# Patient Record
Sex: MALE | Race: WHITE | NOT HISPANIC OR LATINO | Employment: OTHER | URBAN - METROPOLITAN AREA
[De-identification: names, ages, dates, MRNs, and addresses within clinical notes are randomized per-mention and may not be internally consistent; named-entity substitution may affect disease eponyms.]

---

## 2017-09-13 ENCOUNTER — TELEPHONE (OUTPATIENT)
Dept: HEMATOLOGY/ONCOLOGY | Facility: CLINIC | Age: 62
End: 2017-09-13

## 2022-02-10 ENCOUNTER — IMMUNIZATION (OUTPATIENT)
Dept: PRIMARY CARE CLINIC | Facility: CLINIC | Age: 67
End: 2022-02-10
Payer: COMMERCIAL

## 2022-02-10 DIAGNOSIS — Z23 NEED FOR VACCINATION: Primary | ICD-10-CM

## 2022-02-10 PROCEDURE — 91306 COVID-19, MRNA, LNP-S, PF, 100 MCG/0.25 ML DOSE VACCINE (MODERNA BOOSTER): CPT | Mod: PBBFAC | Performed by: INTERNAL MEDICINE

## 2022-02-10 PROCEDURE — 0064A COVID-19, MRNA, LNP-S, PF, 100 MCG/0.25 ML DOSE VACCINE (MODERNA BOOSTER): CPT | Mod: CV19,PBBFAC | Performed by: INTERNAL MEDICINE

## 2022-06-28 ENCOUNTER — TELEPHONE (OUTPATIENT)
Dept: INFECTIOUS DISEASES | Facility: HOSPITAL | Age: 67
End: 2022-06-28
Payer: MEDICARE

## 2022-06-28 ENCOUNTER — OFFICE VISIT (OUTPATIENT)
Dept: URGENT CARE | Facility: CLINIC | Age: 67
End: 2022-06-28
Payer: MEDICARE

## 2022-06-28 VITALS
HEART RATE: 71 BPM | TEMPERATURE: 98 F | OXYGEN SATURATION: 96 % | RESPIRATION RATE: 18 BRPM | DIASTOLIC BLOOD PRESSURE: 78 MMHG | SYSTOLIC BLOOD PRESSURE: 135 MMHG | HEIGHT: 69 IN | BODY MASS INDEX: 29.62 KG/M2 | WEIGHT: 200 LBS

## 2022-06-28 DIAGNOSIS — U07.1 COVID-19 VIRUS DETECTED: Primary | ICD-10-CM

## 2022-06-28 DIAGNOSIS — U07.1 LAB TEST POSITIVE FOR DETECTION OF COVID-19 VIRUS: ICD-10-CM

## 2022-06-28 DIAGNOSIS — U07.1 COVID-19: Primary | ICD-10-CM

## 2022-06-28 DIAGNOSIS — U07.1 COVID-19 VIRUS DETECTED: ICD-10-CM

## 2022-06-28 DIAGNOSIS — R05.9 COUGH: ICD-10-CM

## 2022-06-28 LAB
CTP QC/QA: YES
SARS-COV-2 RDRP RESP QL NAA+PROBE: POSITIVE

## 2022-06-28 PROCEDURE — 99203 PR OFFICE/OUTPT VISIT, NEW, LEVL III, 30-44 MIN: ICD-10-PCS | Mod: CR,S$GLB,, | Performed by: NURSE PRACTITIONER

## 2022-06-28 PROCEDURE — U0002: ICD-10-PCS | Mod: QW,CR,S$GLB, | Performed by: NURSE PRACTITIONER

## 2022-06-28 PROCEDURE — U0002 COVID-19 LAB TEST NON-CDC: HCPCS | Mod: QW,CR,S$GLB, | Performed by: NURSE PRACTITIONER

## 2022-06-28 PROCEDURE — 99203 OFFICE O/P NEW LOW 30 MIN: CPT | Mod: CR,S$GLB,, | Performed by: NURSE PRACTITIONER

## 2022-06-28 RX ORDER — ASPIRIN 81 MG/1
81 TABLET ORAL
COMMUNITY

## 2022-06-28 RX ORDER — EZETIMIBE 10 MG/1
5 TABLET ORAL NIGHTLY
COMMUNITY
Start: 2022-05-25 | End: 2023-05-25

## 2022-06-28 RX ORDER — LISINOPRIL 5 MG/1
5 TABLET ORAL
COMMUNITY
Start: 2022-05-25 | End: 2022-12-15 | Stop reason: CLARIF

## 2022-06-28 RX ORDER — TADALAFIL 5 MG/1
5 TABLET ORAL DAILY
COMMUNITY

## 2022-06-28 RX ORDER — ROSUVASTATIN CALCIUM 20 MG/1
20 TABLET, COATED ORAL NIGHTLY
COMMUNITY
Start: 2022-05-25 | End: 2023-05-25

## 2022-06-28 RX ORDER — FINASTERIDE 5 MG/1
5 TABLET, FILM COATED ORAL EVERY MORNING
COMMUNITY

## 2022-06-28 RX ORDER — GABAPENTIN 300 MG/1
300 CAPSULE ORAL 3 TIMES DAILY PRN
COMMUNITY
Start: 2021-12-02

## 2022-06-28 RX ORDER — TAMSULOSIN HYDROCHLORIDE 0.4 MG/1
0.4 CAPSULE ORAL EVERY MORNING
COMMUNITY

## 2022-06-28 RX ORDER — TIZANIDINE HYDROCHLORIDE 4 MG/1
4 CAPSULE, GELATIN COATED ORAL DAILY PRN
COMMUNITY

## 2022-06-28 RX ORDER — QUETIAPINE FUMARATE 25 MG/1
18.25 TABLET, FILM COATED ORAL NIGHTLY
COMMUNITY

## 2022-06-28 RX ORDER — LEVOTHYROXINE SODIUM 50 UG/1
50 TABLET ORAL
COMMUNITY

## 2022-06-28 RX ORDER — MELOXICAM 15 MG/1
15 TABLET ORAL
COMMUNITY

## 2022-06-28 NOTE — PATIENT INSTRUCTIONS
PLEASE READ YOUR DISCHARGE INSTRUCTIONS ENTIRELY AS IT CONTAINS IMPORTANT INFORMATION.    Patient had covid testing done today.  Discussed corona virus precautions and reviewed CDC FAC; printed a copy for patient.  I discussed to continue to monitor their symptoms. Discussed that if their symptoms persist or worsen to seek re-evaluation. Clinic vs. ER precautions were given.  Patient verbalized understanding and agreed with the entire plan of care.    If Negative and no direct exposure: symptom free without fever reducing meds in 24 hours - can go back to work in 24 hours with surgical mask for 10-14 days.    If Positive and significantly symptomatic: improved symptoms, no fever without fever reducing meds for 24 hours- have to be out for a minimum of 10 days passed from + test  with improvements in symptoms. MAY COME OUT OF QUARANTINE ON DAY 11.      If you tested positive and have symptoms, you must isolate for 5 days starting on the day of your first symptoms  OR day of the positive test if you are asymptomatic. After 5 days (ON DAY #6), if your symptoms have improved and you have not had fever on day 5, you can return to the community on day #6- NO TESTING REQUIRED to return to community! If no fever without fever reducing meds for 24 hours, before coming out of quarantine. After your 5 days of isolation are completed, the CDC recommends strict mask use for the first 5 days (day #6-10) that you come out of isolation.  MAY COME OUT OF QUARANTINE ON DAY#6 DATE** BUT CONTINUE STRICT MASKS FOR ANOTHER 5 DAYS. QUARANTINE START DATE**    This is the most important part, both the CDC and the [facility] emphasize that you do not test out of isolation. In fact, we do not retest if you were positive in the last 90 days.           - Reviewed radiographs and all diagnostic testing with patient/family.    - Rest.  Drink plenty of fluids.    - Tylenol OR anti-inflammatory (NSAIDs, ibuprofen, aleve, motrin) as directed as needed  for fever/pain.  For Tylenol, do not exceed 3000 mg/ day. If no contraindication or allergies.    - continue albuterol inhaler as needed for shortness of breath/wheezing  - do not operate machinery when taking cough syrup or pain meds as they may cause drowsiness.  - take Tessalon as needed for cough suppression. Take cough syrup as needed for cough during at night.     - If you were prescribed antibiotics, please take them to completion. Please supplement with OTC probiotics and yogurt.  Contact clinic if develop profuse diarrhea and weakness.  - If you are female and on birth control pills - please use additional methods of contraception to prevent pregnancy while on antibiotics and for one cycle after.     -Below are suggestions for symptomatic relief:              -Salt water gargles to soothe throat pain.              -Chloroseptic spray also helps to numb throat pain.              -Nasal saline spray reduces inflammation and dryness.              -Warm face compresses to help with facial sinus pain/pressure.              -Vicks vapor rub at night.              -Astelin NASAL SPRAY twice day for nasal/sinus congestion   **may also supplement with 2nd OTC nasal spray to help with inflammation and congestion. Wean to off when you nose becomes to dry or bleed. Also use nasal saline twice a day to help with dryness.               -Flonase OTC or Nasacort OTC  once a day for nasal/sinus congestion. DON'T USE IF YOU HAVE GLAUCOMA. CHECK WITH YOUR PHARMACIST/PHYSICIAN.              -Simple foods like chicken noodle soup.              -Mucinex DM (ANY COUGH EXPECTORANT) for cough or chest congestion with mucus during the day time. Delsym or robitussin (ANY COUGH SUPPRESSANT) helps with coughing at night. Mucinex-D if you have chest congestion or sputum (caution if history of high blood pressure or palpitations).              -Zyrtec/Claritin/xyzal during the day time  & Benadryl at night (only if severe runny nose) may  help with allergies and runny nose. Add decongestant if you have nasal/sinus congestion/sinus pressure/ear fullness sensation. (see below)              -may take OTC meclizine as needed for dizziness or nausea.     Caution with use of Decongestant meds:  -If you DO NOT have Hypertension or any history of palpitations, it is ok to take over the counter Sudafed or Mucinex D or Allegra-D or Claritin-D or Zyrtec-D.  -If you do take one of the above, it is ok to combine that with plain over the counter Mucinex or Allegra or Claritin or Zyrtec. If, for example, you are taking Zyrtec -D, you can combine that with Mucinex, but not Mucinex-D.  If you are taking Mucinex-D, you can combine that with plain Allegra or Claritin or Zyrtec.     -Do not combine pseudophed or phenylephrine with any other brand allergy-D for DECONGESTANT.   -Or vice versa, you can you take plain allergy medications (allegra/claritin/zyrtec with NO Decongestant) and ADD OTC pseudophed or phenelyphrine 2-3 times a day. Avoid taking decongestant late at night or with caffeine as it can keep you up or cause jittery feeling.    -If you DO have Hypertension , anxiety, or palpitations, it is safe to take Coricidin HBP for relief of sinus symptoms.      For your GI symptoms:  -Use gatorade/pedialyte or rehydration packets to help stay hydrated. Vitamin water and plain water do not contain rehydrating electrolytes.  -Increase clear liquids (water, gatorade, pedialyte, broths, jello, etc) Hold off on solids for 12-18 hours. Then advance to BRAT diet (banana, rice, applesauce, tea, toast/crackers), then advance further as tolerated. Avoid spicy or fatty foods.   -May take Emitrol OTC as needed for nausea.   -Use Peptobismol or Immodium to help alleviate your diarrhea symptoms.   -Take mylanta or simethicone for bloating or gas pain.   -Take pepcid or omeprazole if you have heartburn or reflux sensation.  -Avoid imodium unless you have more than 6 loose stools in  24 hours. Take 1 dose and monitor to see if you can repeat AS IT WILL CAUSE CONSTIPATION.  -Wash hands frequently while sick. Avoid ibuprofen or other NSAIDS until you are well.   -Please go to the ER if you experience worsening abdominal pain, blood in your vomit or stool, high fever, dizziness, fainting, swelling of your abdomen, inability to pass gas or stool, or inability to urinate.         -You must understand that you've received an Urgent Care treatment only and that you may be released before all your medical problems are known or treated. You, the patient, will arrange for follow up care as instructed. Please arrange follow up with your primary medical clinic within 2-5 days if your signs and symptoms have not resolved or worsen.     - Follow up with your PCP or specialty clinic as directed.  You can call [phone] or [phone] to schedule an appointment with the appropriate provider.  Schedule [facility] is open Mon-Friday 8-5pm (excluded holidays).    - If your condition worsens or fails to improve we recommend that you receive another evaluation at the emergency room immediately or contact your primary medical clinic to discuss your concerns.            Prevention steps for patients with confirmed or suspected COVID-19  Stay home and stay away from family members and friends. The CDC says, you can leave home after these three things have happened: 1) You have had no fever for at least 24 hours (that is one full day of no fever without the use of medicine that reduces fevers) 2) AND other symptoms have improved (for example, when your cough or shortness of breath have improved) 3) AND at least 10 days have passed since your symptoms first appeared OR after 7-10 days passed from first positive test.  Separate yourself from other people and animals in your home.  Call ahead before visiting your doctor.  Wear a facemask.  Cover your coughs and sneezes.  Wash your hands often with soap and water; hand   can be used, too.  Avoid sharing personal household items.  Wipe down surfaces used daily.  Monitor your symptoms. Seek prompt medical attention if your illness is worsening (e.g., difficulty breathing).   Before seeking care, call your healthcare provider.  If you have a medical emergency and need to call 911, notify the dispatch personnel that you have, or are being evaluated for COVID-19. If possible, put on a facemask before emergency medical services arrive.        Recommended precautions for household members, intimate partners, and caregivers in a home setting of a patient with symptomatic laboratory-confirmed COVID-19 or a patient under investigation.  Household members, intimate partners, and caregivers in the home setting awaiting tests results have close contact with a person with symptomatic, laboratory-confirmed COVID-19 or a person under investigation. Close contacts should monitor their health; they should call their provider right away if they develop symptoms suggestive of COVID-19 (e.g., fever, cough, shortness of breath).    Close contacts should also follow these recommendations:  Make sure that you understand and can help the patient follow their provider's instructions for medication(s) and care. You should help the patient with basic needs in the home and provide support for getting groceries, prescriptions, and other personal needs.  Monitor the patient's symptoms. If the patient is getting sicker, call his or her healthcare provider and tell them that the patient has laboratory-confirmed COVID-19. If the patient has a medical emergency and you need to call 911, notify the dispatch personnel that the patient has, or is being evaluated for COVID-19.  Household members should stay in another room or be  from the patient. Household members should use a separate bedroom and bathroom, if available.  Prohibit visitors.  Household members should care for any pets in the  home.  Make sure that shared spaces in the home have good air flow, such as by an air conditioner or an opened window, weather permitting.  Perform hand hygiene frequently. Wash your hands often with soap and water for at least 20 seconds or use an alcohol-based hand  (that contains > 60% alcohol) covering all surfaces of your hands and rubbing them together until they feel dry. Soap and water should be used preferentially.  Avoid touching your eyes, nose, and mouth.  The patient should wear a facemask. If the patient is not able to wear a facemask (for example, because it causes trouble breathing), caregivers should wear a mask when they are in the same room as the patient.  Wear a disposable facemask and gloves when you touch or have contact with the patient's blood, stool, or body fluids, such as saliva, sputum, nasal mucus, vomit, urine.  Throw out disposable facemasks and gloves after using them. Do not reuse.  When removing personal protective equipment, first remove and dispose of gloves. Then, immediately clean your hands with soap and water or alcohol-based hand . Next, remove and dispose of facemask, and immediately clean your hands again with soap and water or alcohol-based hand .  You should not share dishes, drinking glasses, cups, eating utensils, towels, bedding, or other items with the patient. After the patient uses these items, you should wash them thoroughly (see below Wash laundry thoroughly).  Clean all high-touch surfaces, such as counters, tabletops, doorknobs, bathroom fixtures, toilets, phones, keyboards, tablets, and bedside tables, every day. Also, clean any surfaces that may have blood, stool, or body fluids on them.  Use a household cleaning spray or wipe, according to the label instructions. Labels contain instructions for safe and effective use of the cleaning product including precautions you should take when applying the product, such as wearing gloves  and making sure you have good ventilation during use of the product.  Wash laundry thoroughly.  Immediately remove and wash clothes or bedding that have blood, stool, or body fluids on them.  Wear disposable gloves while handling soiled items and keep soiled items away from your body. Clean your hands (with soap and water or an alcohol-based hand ) immediately after removing your gloves.  Read and follow directions on labels of laundry or clothing items and detergent. In general, using a normal laundry detergent according to washing machine instructions and dry thoroughly using the warmest temperatures recommended on the clothing label.  Place all used disposable gloves, facemasks, and other contaminated items in a lined container before disposing of them with other household waste. Clean your hands (with soap and water or an alcohol-based hand ) immediately after handling these items. Soap and water should be used preferentially if hands are visibly dirty.  Discuss any additional questions with your state or local health department or healthcare provider. Check available hours when contacting your local health department.    For more information see CDC link below.      https://www.cdc.gov/coronavirus/2019-ncov/hcp/guidance-prevent-spread.html#precautions        Sources:  [facility], [place] [facility]          Instructions for Home Care of Patients and Caretakers with Coronavirus Disease 2019  Limit visitors to the home.  Older persons and those that have chronic medical conditions such as diabetes, lung and heart disease are at increased risk for illness.   If possible, patients should use a separate bedroom while recovering. Caregivers and household members should avoid prolonged contact with the patient which means to stay 6 feet away and avoid contact with cough droplets.  When close contact is necessary, wash your hands before and immediately after contact.   Perform hand  hygiene frequently. Wash your hands often with soap and water for at least 20 seconds or use an alcohol-based hand , covering all surfaces of your hands and rubbing them together until they feel dry.   Avoid touching your eyes, nose, and mouth with unwashed hands.  Avoid sharing household items with the patient. You should not share dishes, drinking glasses, cups, eating utensils, towels, bedding, or other items. After the patient uses these items, you should wash them thoroughly.  Wash laundry thoroughly.   Immediately remove and wash clothes or bedding that have blood, stool, or body fluids on them.  Clean all high-touch surfaces, such as counters, tabletops, doorknobs, bathroom fixtures, toilets, phones, keyboards, tablets, and bedside tables, every day.   Use a household cleaning spray or wipe, according to the label instructions. Labels contain instructions for safe and effective use of the cleaning product including precautions you should take when applying the product, such as wearing gloves and making sure you have good ventilation during use of the product.    For more information see CDC link below.      https://www.cdc.gov/coronavirus/2019-ncov/hcp/guidance-prevent-spread.html#precautions               If your symptoms worsen or if you have any other concerns, please contact [facility] at [phone].

## 2022-06-28 NOTE — PROGRESS NOTES
"Subjective:       Patient ID: Sarabjit Velasquez is a 66 y.o. male.    Vitals:  height is 5' 9" (1.753 m) and weight is 90.7 kg (200 lb). His temperature is 98.2 °F (36.8 °C). His blood pressure is 135/78 and his pulse is 71. His respiration is 18 and oxygen saturation is 96%.     Chief Complaint: URI    Pt reports being around granddaughter who tested + for covid. He has a sore throat and nasal congestion.     URI   This is a new problem. The current episode started yesterday. The problem has been unchanged. There has been no fever. Associated symptoms include congestion, coughing, headaches and a sore throat. Treatments tried: claritin.       HENT: Positive for congestion and sore throat.    Respiratory: Positive for cough.    Neurological: Positive for headaches.       Objective:      Physical Exam   Constitutional: No distress.   HENT:   Ears:   Right Ear: External ear normal.   Left Ear: External ear normal.   Pulmonary/Chest: No respiratory distress.   Abdominal: Normal appearance.   Neurological: He is alert.   Nursing note and vitals reviewed.        Results for orders placed or performed in visit on 06/28/22   POCT COVID-19 Rapid Screening   Result Value Ref Range    POC Rapid COVID Positive (A) Negative     Acceptable Yes      Assessment:       1. COVID-19 virus detected    2. Cough    3. Lab test positive for detection of COVID-19 virus          Plan:     Telephone interview conducted in urgent care today. Exam denotes features of patient observation only.   Vitals stable. No evidence of respiratory distress noted, able to speak in complete sentences without pause.    Requesting COVID-19 testing post exposure and given symptoms.   Tested for COVID-19 today. Rapid test was Positive. Educated on COVID-19 and COVID-19 testing. Advised on COVID-19 precautions. Discussed supportive care and OTC meds for symptom relief. Advised on return/follow-up precautions. Advised on ER precautions. Answered " all patient questions. Patient verbalized understanding and voiced agreement with current treatment plan.    Pt on Tadalafil and statin drugs. He is requesting EUA infusion      COVID-19 virus detected    Cough  -     POCT COVID-19 Rapid Screening    Lab test positive for detection of COVID-19 virus               Patient Instructions     PLEASE READ YOUR DISCHARGE INSTRUCTIONS ENTIRELY AS IT CONTAINS IMPORTANT INFORMATION.    Patient had covid testing done today.  Discussed corona virus precautions and reviewed CDC FAC; printed a copy for patient.  I discussed to continue to monitor their symptoms. Discussed that if their symptoms persist or worsen to seek re-evaluation. Clinic vs. ER precautions were given.  Patient verbalized understanding and agreed with the entire plan of care.    If Negative and no direct exposure: symptom free without fever reducing meds in 24 hours - can go back to work in 24 hours with surgical mask for 10-14 days.    If Positive and significantly symptomatic: improved symptoms, no fever without fever reducing meds for 24 hours- have to be out for a minimum of 10 days passed from + test  with improvements in symptoms. MAY COME OUT OF QUARANTINE ON DAY 11.      If you tested positive and have symptoms, you must isolate for 5 days starting on the day of your first symptoms  OR day of the positive test if you are asymptomatic. After 5 days (ON DAY #6), if your symptoms have improved and you have not had fever on day 5, you can return to the community on day #6- NO TESTING REQUIRED to return to community! If no fever without fever reducing meds for 24 hours, before coming out of quarantine. After your 5 days of isolation are completed, the CDC recommends strict mask use for the first 5 days (day #6-10) that you come out of isolation.  MAY COME OUT OF QUARANTINE ON DAY#6 DATE** BUT CONTINUE STRICT MASKS FOR ANOTHER 5 DAYS. QUARANTINE START DATE**    This is the most important part, both the CDC  and the [facility] emphasize that you do not test out of isolation. In fact, we do not retest if you were positive in the last 90 days.           - Reviewed radiographs and all diagnostic testing with patient/family.    - Rest.  Drink plenty of fluids.    - Tylenol OR anti-inflammatory (NSAIDs, ibuprofen, aleve, motrin) as directed as needed for fever/pain.  For Tylenol, do not exceed 3000 mg/ day. If no contraindication or allergies.    - continue albuterol inhaler as needed for shortness of breath/wheezing  - do not operate machinery when taking cough syrup or pain meds as they may cause drowsiness.  - take Tessalon as needed for cough suppression. Take cough syrup as needed for cough during at night.     - If you were prescribed antibiotics, please take them to completion. Please supplement with OTC probiotics and yogurt.  Contact clinic if develop profuse diarrhea and weakness.  - If you are female and on birth control pills - please use additional methods of contraception to prevent pregnancy while on antibiotics and for one cycle after.     -Below are suggestions for symptomatic relief:              -Salt water gargles to soothe throat pain.              -Chloroseptic spray also helps to numb throat pain.              -Nasal saline spray reduces inflammation and dryness.              -Warm face compresses to help with facial sinus pain/pressure.              -Vicks vapor rub at night.              -Astelin NASAL SPRAY twice day for nasal/sinus congestion   **may also supplement with 2nd OTC nasal spray to help with inflammation and congestion. Wean to off when you nose becomes to dry or bleed. Also use nasal saline twice a day to help with dryness.               -Flonase OTC or Nasacort OTC  once a day for nasal/sinus congestion. DON'T USE IF YOU HAVE GLAUCOMA. CHECK WITH YOUR PHARMACIST/PHYSICIAN.              -Simple foods like chicken noodle soup.              -Mucinex DM (ANY COUGH EXPECTORANT) for cough or chest  congestion with mucus during the day time. Delsym or robitussin (ANY COUGH SUPPRESSANT) helps with coughing at night. Mucinex-D if you have chest congestion or sputum (caution if history of high blood pressure or palpitations).              -Zyrtec/Claritin/xyzal during the day time  & Benadryl at night (only if severe runny nose) may help with allergies and runny nose. Add decongestant if you have nasal/sinus congestion/sinus pressure/ear fullness sensation. (see below)              -may take OTC meclizine as needed for dizziness or nausea.     Caution with use of Decongestant meds:  -If you DO NOT have Hypertension or any history of palpitations, it is ok to take over the counter Sudafed or Mucinex D or Allegra-D or Claritin-D or Zyrtec-D.  -If you do take one of the above, it is ok to combine that with plain over the counter Mucinex or Allegra or Claritin or Zyrtec. If, for example, you are taking Zyrtec -D, you can combine that with Mucinex, but not Mucinex-D.  If you are taking Mucinex-D, you can combine that with plain Allegra or Claritin or Zyrtec.     -Do not combine pseudophed or phenylephrine with any other brand allergy-D for DECONGESTANT.   -Or vice versa, you can you take plain allergy medications (allegra/claritin/zyrtec with NO Decongestant) and ADD OTC pseudophed or phenelyphrine 2-3 times a day. Avoid taking decongestant late at night or with caffeine as it can keep you up or cause jittery feeling.    -If you DO have Hypertension , anxiety, or palpitations, it is safe to take Coricidin HBP for relief of sinus symptoms.      For your GI symptoms:  -Use gatorade/pedialyte or rehydration packets to help stay hydrated. Vitamin water and plain water do not contain rehydrating electrolytes.  -Increase clear liquids (water, gatorade, pedialyte, broths, jello, etc) Hold off on solids for 12-18 hours. Then advance to BRAT diet (banana, rice, applesauce, tea, toast/crackers), then advance further as  tolerated. Avoid spicy or fatty foods.   -May take Emitrol OTC as needed for nausea.   -Use Peptobismol or Immodium to help alleviate your diarrhea symptoms.   -Take mylanta or simethicone for bloating or gas pain.   -Take pepcid or omeprazole if you have heartburn or reflux sensation.  -Avoid imodium unless you have more than 6 loose stools in 24 hours. Take 1 dose and monitor to see if you can repeat AS IT WILL CAUSE CONSTIPATION.  -Wash hands frequently while sick. Avoid ibuprofen or other NSAIDS until you are well.   -Please go to the ER if you experience worsening abdominal pain, blood in your vomit or stool, high fever, dizziness, fainting, swelling of your abdomen, inability to pass gas or stool, or inability to urinate.         -You must understand that you've received an Urgent Care treatment only and that you may be released before all your medical problems are known or treated. You, the patient, will arrange for follow up care as instructed. Please arrange follow up with your primary medical clinic within 2-5 days if your signs and symptoms have not resolved or worsen.     - Follow up with your PCP or specialty clinic as directed.  You can call [phone] or [phone] to schedule an appointment with the appropriate provider.  Schedule CENTER is open Mon-Friday 8-5pm (excluded holidays).    - If your condition worsens or fails to improve we recommend that you receive another evaluation at the emergency room immediately or contact your primary medical clinic to discuss your concerns.            Prevention steps for patients with confirmed or suspected COVID-19   Stay home and stay away from family members and friends. The CDC says, you can leave home after these three things have happened: 1) You have had no fever for at least 24 hours (that is one full day of no fever without the use of medicine that reduces fevers) 2) AND other symptoms have improved (for example, when your cough or shortness of  breath have improved) 3) AND at least 10 days have passed since your symptoms first appeared OR after 7-10 days passed from first positive test.   Separate yourself from other people and animals in your home.   Call ahead before visiting your doctor.   Wear a facemask.   Cover your coughs and sneezes.   Wash your hands often with soap and water; hand  can be used, too.   Avoid sharing personal household items.   Wipe down surfaces used daily.   Monitor your symptoms. Seek prompt medical attention if your illness is worsening (e.g., difficulty breathing).    Before seeking care, call your healthcare provider.   If you have a medical emergency and need to call 911, notify the dispatch personnel that you have, or are being evaluated for COVID-19. If possible, put on a facemask before emergency medical services arrive.        Recommended precautions for household members, intimate partners, and caregivers in a home setting of a patient with symptomatic laboratory-confirmed COVID-19 or a patient under investigation.  Household members, intimate partners, and caregivers in the home setting awaiting tests results have close contact with a person with symptomatic, laboratory-confirmed COVID-19 or a person under investigation. Close contacts should monitor their health; they should call their provider right away if they develop symptoms suggestive of COVID-19 (e.g., fever, cough, shortness of breath).    Close contacts should also follow these recommendations:   Make sure that you understand and can help the patient follow their provider's instructions for medication(s) and care. You should help the patient with basic needs in the home and provide support for getting groceries, prescriptions, and other personal needs.   Monitor the patient's symptoms. If the patient is getting sicker, call his or her healthcare provider and tell them that the patient has laboratory-confirmed COVID-19. If the patient has  a medical emergency and you need to call 911, notify the dispatch personnel that the patient has, or is being evaluated for COVID-19.   Household members should stay in another room or be  from the patient. Household members should use a separate bedroom and bathroom, if available.   Prohibit visitors.   Household members should care for any pets in the home.   Make sure that shared spaces in the home have good air flow, such as by an air conditioner or an opened window, weather permitting.   Perform hand hygiene frequently. Wash your hands often with soap and water for at least 20 seconds or use an alcohol-based hand  (that contains > 60% alcohol) covering all surfaces of your hands and rubbing them together until they feel dry. Soap and water should be used preferentially.   Avoid touching your eyes, nose, and mouth.   The patient should wear a facemask. If the patient is not able to wear a facemask (for example, because it causes trouble breathing), caregivers should wear a mask when they are in the same room as the patient.   Wear a disposable facemask and gloves when you touch or have contact with the patient's blood, stool, or body fluids, such as saliva, sputum, nasal mucus, vomit, urine.  o Throw out disposable facemasks and gloves after using them. Do not reuse.  o When removing personal protective equipment, first remove and dispose of gloves. Then, immediately clean your hands with soap and water or alcohol-based hand . Next, remove and dispose of facemask, and immediately clean your hands again with soap and water or alcohol-based hand .   You should not share dishes, drinking glasses, cups, eating utensils, towels, bedding, or other items with the patient. After the patient uses these items, you should wash them thoroughly (see below Wash laundry thoroughly).   Clean all high-touch surfaces, such as counters, tabletops, doorknobs, bathroom fixtures,  toilets, phones, keyboards, tablets, and bedside tables, every day. Also, clean any surfaces that may have blood, stool, or body fluids on them.   Use a household cleaning spray or wipe, according to the label instructions. Labels contain instructions for safe and effective use of the cleaning product including precautions you should take when applying the product, such as wearing gloves and making sure you have good ventilation during use of the product.   Wash laundry thoroughly.  o Immediately remove and wash clothes or bedding that have blood, stool, or body fluids on them.  o Wear disposable gloves while handling soiled items and keep soiled items away from your body. Clean your hands (with soap and water or an alcohol-based hand ) immediately after removing your gloves.  o Read and follow directions on labels of laundry or clothing items and detergent. In general, using a normal laundry detergent according to washing machine instructions and dry thoroughly using the warmest temperatures recommended on the clothing label.   Place all used disposable gloves, facemasks, and other contaminated items in a lined container before disposing of them with other household waste. Clean your hands (with soap and water or an alcohol-based hand ) immediately after handling these items. Soap and water should be used preferentially if hands are visibly dirty.   Discuss any additional questions with your state or local health department or healthcare provider. Check available hours when contacting your local health department.    For more information see CDC link below.      https://www.cdc.gov/coronavirus/2019-ncov/hcp/guidance-prevent-spread.html#precautions        Sources:  [facility]          Instructions for Home Care of Patients and Caretakers with Coronavirus Disease 2019   Limit visitors to the home.  Older persons and those that have chronic medical  conditions such as diabetes, lung and heart disease are at increased risk for illness.    If possible, patients should use a separate bedroom while recovering. Caregivers and household members should avoid prolonged contact with the patient which means to stay 6 feet away and avoid contact with cough droplets.  When close contact is necessary, wash your hands before and immediately after contact.    Perform hand hygiene frequently. Wash your hands often with soap and water for at least 20 seconds or use an alcohol-based hand , covering all surfaces of your hands and rubbing them together until they feel dry.    Avoid touching your eyes, nose, and mouth with unwashed hands.   Avoid sharing household items with the patient. You should not share dishes, drinking glasses, cups, eating utensils, towels, bedding, or other items. After the patient uses these items, you should wash them thoroughly.   Wash laundry thoroughly.   o Immediately remove and wash clothes or bedding that have blood, stool, or body fluids on them.   Clean all high-touch surfaces, such as counters, tabletops, doorknobs, bathroom fixtures, toilets, phones, keyboards, tablets, and bedside tables, every day.   o Use a household cleaning spray or wipe, according to the label instructions. Labels contain instructions for safe and effective use of the cleaning product including precautions you should take when applying the product, such as wearing gloves and making sure you have good ventilation during use of the product.    For more information see CDC link below.      https://www.cdc.gov/coronavirus/2019-ncov/hcp/guidance-prevent-spread.html#precautions               If your symptoms worsen or if you have any other concerns, please contact [facility] at [phone].

## 2022-12-14 ENCOUNTER — HOSPITAL ENCOUNTER (OUTPATIENT)
Dept: RADIOLOGY | Facility: HOSPITAL | Age: 67
Discharge: HOME | End: 2022-12-14
Attending: STUDENT IN AN ORGANIZED HEALTH CARE EDUCATION/TRAINING PROGRAM
Payer: MEDICARE

## 2022-12-14 ENCOUNTER — OFFICE VISIT (OUTPATIENT)
Dept: OTOLARYNGOLOGY | Facility: CLINIC | Age: 67
DRG: 027 | End: 2022-12-14
Payer: MEDICARE

## 2022-12-14 VITALS
DIASTOLIC BLOOD PRESSURE: 87 MMHG | BODY MASS INDEX: 28.98 KG/M2 | WEIGHT: 196.19 LBS | HEART RATE: 95 BPM | SYSTOLIC BLOOD PRESSURE: 138 MMHG

## 2022-12-14 DIAGNOSIS — J34.89 OTHER SPECIFIED DISORDERS OF NOSE AND NASAL SINUSES: ICD-10-CM

## 2022-12-14 DIAGNOSIS — G97.82 POSTOPERATIVE CSF LEAK: Primary | ICD-10-CM

## 2022-12-14 DIAGNOSIS — J32.9 CHRONIC SINUSITIS, UNSPECIFIED LOCATION: ICD-10-CM

## 2022-12-14 DIAGNOSIS — G96.00 POSTOPERATIVE CSF LEAK: Primary | ICD-10-CM

## 2022-12-14 PROCEDURE — 99999 PR PBB SHADOW E&M-EST. PATIENT-LVL V: ICD-10-PCS | Mod: PBBFAC,,, | Performed by: STUDENT IN AN ORGANIZED HEALTH CARE EDUCATION/TRAINING PROGRAM

## 2022-12-14 PROCEDURE — 99215 OFFICE O/P EST HI 40 MIN: CPT | Mod: PBBFAC,25 | Performed by: STUDENT IN AN ORGANIZED HEALTH CARE EDUCATION/TRAINING PROGRAM

## 2022-12-14 PROCEDURE — 70486 CT MAXILLOFACIAL W/O DYE: CPT | Mod: TC

## 2022-12-14 PROCEDURE — 70486 CT MEDTRONIC SINUSES WITHOUT: ICD-10-PCS | Mod: 26,,, | Performed by: RADIOLOGY

## 2022-12-14 PROCEDURE — 99999 PR PBB SHADOW E&M-EST. PATIENT-LVL V: CPT | Mod: PBBFAC,,, | Performed by: STUDENT IN AN ORGANIZED HEALTH CARE EDUCATION/TRAINING PROGRAM

## 2022-12-14 PROCEDURE — 70486 CT MAXILLOFACIAL W/O DYE: CPT | Mod: 26,,, | Performed by: RADIOLOGY

## 2022-12-14 PROCEDURE — 99205 OFFICE O/P NEW HI 60 MIN: CPT | Mod: 25,S$PBB,, | Performed by: STUDENT IN AN ORGANIZED HEALTH CARE EDUCATION/TRAINING PROGRAM

## 2022-12-14 PROCEDURE — 99205 PR OFFICE/OUTPT VISIT, NEW, LEVL V, 60-74 MIN: ICD-10-PCS | Mod: 25,S$PBB,, | Performed by: STUDENT IN AN ORGANIZED HEALTH CARE EDUCATION/TRAINING PROGRAM

## 2022-12-14 RX ORDER — CEFUROXIME AXETIL 500 MG/1
500 TABLET ORAL 2 TIMES DAILY
COMMUNITY
Start: 2022-12-08

## 2022-12-14 NOTE — PROGRESS NOTES
Otolaryngology - Head and Neck Surgery New Patient Visit    12/14/2022    Referring Provider: Justo Thomson MD    Chief Complaint   Patient presents with    consult/possible csf leak, hx of sinus surgery     History of Present Illness, Otolaryngology Specialty-Specific Exam, and Assessment and Plan:     Sarabjit Velasquez is a 67 y.o. male who presents for evaluation of a possible CSF leak, which has been present for about 1 week. He reports having revision ESS on 12/8/22 with Dr. Thomson who noted some thinning of the skull base during surgery. Patient reports that he put some mesh over the defect. Since surgery has been having mostly left sided nasal drainage, clear in nature. Causing him to cough significantly keeping him up at night. Reports some head aches and low grade fevers (around 100F). He denies any changes in his mental status, no neck stiffness, no photophobia or nausea/vomiting. He was started on Cefuroxime by Dr. Thomson. He reports having previous ESS x 2, one in 2016, and the most recent one last week. Was told there was some fungus within one of his sinuses.     Reports that over the last 3-4 months his sinus infections have been getting worse. Having sinus congestion, PND, cough. Was seen at [facility] ([place]) by Dr. Tolbert in October 2022. Cultures showed pseudomonas within his sinuses.     He had a CT of the sinuses done on 12/14/22 which I reviewed along with the associated radiology report.    On exam today, the ears are normal. The oral cavity is clear. The neck is clear. The nasopharynx, hypopharynx, and larynx are normal. Nasal endoscopy reveals post surgical changes to the bilateral sinuses. 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 Exposed skull base in left posterior ethmoids, pulsatile clear fluid eminating from defect.     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 I have sampled his fluid for B2 testing. He would benefit from endoscopic CSF repair with NSF vs MTF for the  treatment of his condition.  I discussed the risks, benefits and alternatives to surgery with the patient, as well as the expected postoperative course.  I gave him the opportunity to ask questions and I answered all of them.  I provided relevant printed information on his condition for him to review at home.  Postoperative admission is anticipated for lumbar drain management.  He may have anesthesia triage by telephone.   The surgery will be tentatively scheduled for 12/16/22.  He will return for a postoperative visit 1 week after surgery. He should continue taking his Cefuroxime in the mean time. I discussed that if he has any change in mental status, severe head aches, or photophobia/neck stiffness, he should proceed to the ED for emergent evaluation.     The risks and benefits of endoscopic surgery were discussed with the patient in detail, which include but are not limited to pain, bleeding, infection, the need for reoperation, injury to orbit or orbital contents, injury to brain or meninges, and unforeseeable complications of surgery including myocardial infarction, stroke or pulmonary embolus.     Thank you for allowing us to participate in the care of your patient. We will continue to keep you informed of his progress.    Sincerely yours,    Paulino Zhu MD      Objective     Physical Examination  Vitals -  weight is 89 kg (196 lb 3.4 oz).   Constitutional - General appearance: Normal. Ability to communicate: Normal.  Head & Face - Overall appearance, scars, masses: Normal. Palpation &/or percussion of face: Normal. Salivary glands: Normal. Facial strength: Normal  ENMT - Otoscopic exam: Normal. Assessment of hearing: Normal. External inspection: Normal. Nasal mucosa, septum, turbinates: Abnormal see exam details. Lips, teeth, gums: Normal. Oropharynx: Normal. Pharyngeal walls/pyriform sinus: Normal. Larynx: Normal. Nasopharynx: Normal  Neck - Neck: Normal. Thyroid: Normal  Lymphatic - Palpation of lymph  nodes: Normal  Eyes - Ocular mobility: Normal  Respiratory - Inspection of Chest: Normal  Cardiovascular - Peripheral vascular system: Normal  Neurological/Psychiatric - Orientation: Normal    Review of Systems  A complete review of systems was obtained 12/14/2022 and reviewed.  The review of systems is negative for symptoms except as described above.    Wt 89 kg (196 lb 3.4 oz)   BMI 28.98 kg/m²      Nasal Endoscopy:  12/14/2022    The use of diagnostic nasal endoscopy was considered medically necessary for the evaluation and visualization of the nasal anatomy for symptoms suggestive of nasal or sinus origin. Physical examination (including a nasal speculum evaluation) did not provide sufficient clinical information to establish a diagnosis, or symptoms did not improve or worsened following treatment.     The nasal cavity was decongested with topical 1% phenylephrine and anesthetized with 4% lidocaine.  A rigid 0-degree endoscope was introduced into the nasal cavity.    The patient was seated in the examination chair. After discussion of risks and benefits, a nasal endoscope was inserted into the nose the endoscope was passed along the left nasal floor to the nasopharynx. It was then passed between the middle and superior meatus, nasal turbinates, nasal septum, nasopharynx and sphenoethmoid region. The nasal endoscope was withdrawn and there was no complications. An identical procedure was performed on the right side. I was present for the entire procedure.The patient tolerated the above procedure well. The findings of this procedure can be found in the dictated note from 12/14/2022 visit.                                    Data Reviewed    No results found for: WBC  No results found for: EOSINOPHIL  No results found for: EOS  No results found for: PLT  No results found for: GLU  No results found for: IGE    I independently reviewed the images of the CT sinuses dated 12/14/22. Pertinent findings include post  surgical changes to the sinus cavities. Previous ethmoidectomy and maxillary antrostomy. Thinning of the left cribriform. No pneumocephalus. Chronic ostitic changes the the sphenoid sinus bilaterally. Residual packing within the right middle meatus. Polypoid material within the bilateral frontal sinus recesses.

## 2022-12-14 NOTE — H&P (VIEW-ONLY)
Otolaryngology - Head and Neck Surgery New Patient Visit    12/14/2022    Referring Provider: Justo Thomson MD    Chief Complaint   Patient presents with    consult/possible csf leak, hx of sinus surgery     History of Present Illness, Otolaryngology Specialty-Specific Exam, and Assessment and Plan:     Sarabjit Velasquez is a 67 y.o. male who presents for evaluation of a possible CSF leak, which has been present for about 1 week. He reports having revision ESS on 12/8/22 with Dr. Thomson who noted some thinning of the skull base during surgery. Patient reports that he put some mesh over the defect. Since surgery has been having mostly left sided nasal drainage, clear in nature. Causing him to cough significantly keeping him up at night. Reports some head aches and low grade fevers (around 100F). He denies any changes in his mental status, no neck stiffness, no photophobia or nausea/vomiting. He was started on Cefuroxime by Dr. Thomson. He reports having previous ESS x 2, one in 2016, and the most recent one last week. Was told there was some fungus within one of his sinuses.     Reports that over the last 3-4 months his sinus infections have been getting worse. Having sinus congestion, PND, cough. Was seen at [facility] ([place] by Dr. Tolbert in October 2022. Cultures showed pseudomonas within his sinuses.     He had a CT of the sinuses done on 12/14/22 which I reviewed along with the associated radiology report.    On exam today, the ears are normal. The oral cavity is clear. The neck is clear. The nasopharynx, hypopharynx, and larynx are normal. Nasal endoscopy reveals post surgical changes to the bilateral sinuses. 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 Exposed skull base in left posterior ethmoids, pulsatile clear fluid eminating from defect.     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 I have sampled his fluid for B2 testing. He would benefit from endoscopic CSF repair with NSF vs MTF for the  treatment of his condition.  I discussed the risks, benefits and alternatives to surgery with the patient, as well as the expected postoperative course.  I gave him the opportunity to ask questions and I answered all of them.  I provided relevant printed information on his condition for him to review at home.  Postoperative admission is anticipated for lumbar drain management.  He may have anesthesia triage by telephone.   The surgery will be tentatively scheduled for 12/16/22.  He will return for a postoperative visit 1 week after surgery. He should continue taking his Cefuroxime in the mean time. I discussed that if he has any change in mental status, severe head aches, or photophobia/neck stiffness, he should proceed to the ED for emergent evaluation.     The risks and benefits of endoscopic surgery were discussed with the patient in detail, which include but are not limited to pain, bleeding, infection, the need for reoperation, injury to orbit or orbital contents, injury to brain or meninges, and unforeseeable complications of surgery including myocardial infarction, stroke or pulmonary embolus.     Thank you for allowing us to participate in the care of your patient. We will continue to keep you informed of his progress.    Sincerely yours,    Paulino Zhu MD      Objective     Physical Examination  Vitals -  weight is 89 kg (196 lb 3.4 oz).   Constitutional - General appearance: Normal. Ability to communicate: Normal.  Head & Face - Overall appearance, scars, masses: Normal. Palpation &/or percussion of face: Normal. Salivary glands: Normal. Facial strength: Normal  ENMT - Otoscopic exam: Normal. Assessment of hearing: Normal. External inspection: Normal. Nasal mucosa, septum, turbinates: Abnormal see exam details. Lips, teeth, gums: Normal. Oropharynx: Normal. Pharyngeal walls/pyriform sinus: Normal. Larynx: Normal. Nasopharynx: Normal  Neck - Neck: Normal. Thyroid: Normal  Lymphatic - Palpation of lymph  nodes: Normal  Eyes - Ocular mobility: Normal  Respiratory - Inspection of Chest: Normal  Cardiovascular - Peripheral vascular system: Normal  Neurological/Psychiatric - Orientation: Normal    Review of Systems  A complete review of systems was obtained 12/14/2022 and reviewed.  The review of systems is negative for symptoms except as described above.    Wt 89 kg (196 lb 3.4 oz)   BMI 28.98 kg/m²      Nasal Endoscopy:  12/14/2022    The use of diagnostic nasal endoscopy was considered medically necessary for the evaluation and visualization of the nasal anatomy for symptoms suggestive of nasal or sinus origin. Physical examination (including a nasal speculum evaluation) did not provide sufficient clinical information to establish a diagnosis, or symptoms did not improve or worsened following treatment.     The nasal cavity was decongested with topical 1% phenylephrine and anesthetized with 4% lidocaine.  A rigid 0-degree endoscope was introduced into the nasal cavity.    The patient was seated in the examination chair. After discussion of risks and benefits, a nasal endoscope was inserted into the nose the endoscope was passed along the left nasal floor to the nasopharynx. It was then passed between the middle and superior meatus, nasal turbinates, nasal septum, nasopharynx and sphenoethmoid region. The nasal endoscope was withdrawn and there was no complications. An identical procedure was performed on the right side. I was present for the entire procedure.The patient tolerated the above procedure well. The findings of this procedure can be found in the dictated note from 12/14/2022 visit.                                    Data Reviewed    No results found for: WBC  No results found for: EOSINOPHIL  No results found for: EOS  No results found for: PLT  No results found for: GLU  No results found for: IGE    I independently reviewed the images of the CT sinuses dated 12/14/22. Pertinent findings include post  surgical changes to the sinus cavities. Previous ethmoidectomy and maxillary antrostomy. Thinning of the left cribriform. No pneumocephalus. Chronic ostitic changes the the sphenoid sinus bilaterally. Residual packing within the right middle meatus. Polypoid material within the bilateral frontal sinus recesses.

## 2022-12-15 ENCOUNTER — TELEPHONE (OUTPATIENT)
Dept: OTOLARYNGOLOGY | Facility: CLINIC | Age: 67
End: 2022-12-15
Payer: MEDICARE

## 2022-12-15 RX ORDER — TELMISARTAN 20 MG/1
TABLET ORAL NIGHTLY
COMMUNITY

## 2022-12-15 NOTE — TELEPHONE ENCOUNTER
----- Message from Jes Stephenson sent at 12/15/2022  3:27 PM CST -----  Contact: Pt  Pt is requesting a callback in regards to surgery appt. Scheduled for tomorrow 12/16. Pt would like to know arrival time and instructions for appt. Please adv pt.       Confirmed contact below:   Contact Name:Sarabjit Velasquez  Phone Number: [phone]

## 2022-12-16 ENCOUNTER — ANESTHESIA EVENT (OUTPATIENT)
Dept: SURGERY | Facility: HOSPITAL | Age: 67
DRG: 027 | End: 2022-12-16
Payer: MEDICARE

## 2022-12-16 ENCOUNTER — ANESTHESIA (OUTPATIENT)
Dept: SURGERY | Facility: HOSPITAL | Age: 67
DRG: 027 | End: 2022-12-16
Payer: MEDICARE

## 2022-12-16 ENCOUNTER — HOSPITAL ENCOUNTER (INPATIENT)
Facility: HOSPITAL | Age: 67
LOS: 5 days | Discharge: HOME | DRG: 027 | End: 2022-12-21
Attending: STUDENT IN AN ORGANIZED HEALTH CARE EDUCATION/TRAINING PROGRAM | Admitting: STUDENT IN AN ORGANIZED HEALTH CARE EDUCATION/TRAINING PROGRAM
Payer: MEDICARE

## 2022-12-16 DIAGNOSIS — G97.82 POSTOPERATIVE CSF LEAK: Primary | ICD-10-CM

## 2022-12-16 DIAGNOSIS — G96.00 CSF LEAK: ICD-10-CM

## 2022-12-16 DIAGNOSIS — I27.20 PULMONARY HYPERTENSION: ICD-10-CM

## 2022-12-16 DIAGNOSIS — G96.01 CSF LEAK FROM NOSE: ICD-10-CM

## 2022-12-16 DIAGNOSIS — G96.00 POSTOPERATIVE CSF LEAK: Primary | ICD-10-CM

## 2022-12-16 PROBLEM — I10 PRIMARY HYPERTENSION: Status: ACTIVE | Noted: 2022-12-16

## 2022-12-16 PROBLEM — E03.8 OTHER SPECIFIED HYPOTHYROIDISM: Status: ACTIVE | Noted: 2022-12-16

## 2022-12-16 PROBLEM — R33.9 URINARY RETENTION: Status: ACTIVE | Noted: 2022-12-16

## 2022-12-16 PROBLEM — I25.10: Status: ACTIVE | Noted: 2022-12-16

## 2022-12-16 LAB
ABO + RH BLD: NORMAL
ALBUMIN SERPL BCP-MCNC: 3 G/DL (ref 3.5–5.2)
ALP SERPL-CCNC: 86 U/L (ref 55–135)
ALT SERPL W/O P-5'-P-CCNC: 66 U/L (ref 10–44)
ANION GAP SERPL CALC-SCNC: 9 MMOL/L (ref 8–16)
APTT BLDCRRT: 24.3 SEC (ref 21–32)
AST SERPL-CCNC: 31 U/L (ref 10–40)
B2 TRANSFERRIN FLD QL: POSITIVE
BASOPHILS # BLD AUTO: 0.03 K/UL (ref 0–0.2)
BASOPHILS NFR BLD: 0.3 % (ref 0–1.9)
BILIRUB SERPL-MCNC: 1 MG/DL (ref 0.1–1)
BLD GP AB SCN CELLS X3 SERPL QL: NORMAL
BUN SERPL-MCNC: 16 MG/DL (ref 8–23)
CALCIUM SERPL-MCNC: 8.4 MG/DL (ref 8.7–10.5)
CHLORIDE SERPL-SCNC: 107 MMOL/L (ref 95–110)
CHOLEST SERPL-MCNC: 158 MG/DL (ref 120–199)
CHOLEST/HDLC SERPL: 5.6 {RATIO} (ref 2–5)
CO2 SERPL-SCNC: 24 MMOL/L (ref 23–29)
CREAT SERPL-MCNC: 0.8 MG/DL (ref 0.5–1.4)
DIFFERENTIAL METHOD: ABNORMAL
EOSINOPHIL # BLD AUTO: 0.1 K/UL (ref 0–0.5)
EOSINOPHIL NFR BLD: 1.3 % (ref 0–8)
ERYTHROCYTE [DISTWIDTH] IN BLOOD BY AUTOMATED COUNT: 14.9 % (ref 11.5–14.5)
EST. GFR  (NO RACE VARIABLE): >60 ML/MIN/1.73 M^2
ESTIMATED AVG GLUCOSE: 105 MG/DL (ref 68–131)
GLUCOSE SERPL-MCNC: 110 MG/DL (ref 70–110)
HBA1C MFR BLD: 5.3 % (ref 4–5.6)
HCT VFR BLD AUTO: 45.1 % (ref 40–54)
HDLC SERPL-MCNC: 28 MG/DL (ref 40–75)
HDLC SERPL: 17.7 % (ref 20–50)
HGB BLD-MCNC: 15 G/DL (ref 14–18)
IMM GRANULOCYTES # BLD AUTO: 0.09 K/UL (ref 0–0.04)
IMM GRANULOCYTES NFR BLD AUTO: 0.9 % (ref 0–0.5)
INR PPP: 1.1 (ref 0.8–1.2)
LDLC SERPL CALC-MCNC: 103.6 MG/DL (ref 63–159)
LYMPHOCYTES # BLD AUTO: 0.5 K/UL (ref 1–4.8)
LYMPHOCYTES NFR BLD: 5.2 % (ref 18–48)
MAGNESIUM SERPL-MCNC: 2 MG/DL (ref 1.6–2.6)
MCH RBC QN AUTO: 31.5 PG (ref 27–31)
MCHC RBC AUTO-ENTMCNC: 33.3 G/DL (ref 32–36)
MCV RBC AUTO: 95 FL (ref 82–98)
MONOCYTES # BLD AUTO: 0.1 K/UL (ref 0.3–1)
MONOCYTES NFR BLD: 1.1 % (ref 4–15)
NEUTROPHILS # BLD AUTO: 9.4 K/UL (ref 1.8–7.7)
NEUTROPHILS NFR BLD: 91.2 % (ref 38–73)
NONHDLC SERPL-MCNC: 130 MG/DL
NRBC BLD-RTO: 0 /100 WBC
PHOSPHATE SERPL-MCNC: 2.8 MG/DL (ref 2.7–4.5)
PLATELET # BLD AUTO: 174 K/UL (ref 150–450)
PMV BLD AUTO: 10.3 FL (ref 9.2–12.9)
POCT GLUCOSE: 115 MG/DL (ref 70–110)
POTASSIUM SERPL-SCNC: 4.6 MMOL/L (ref 3.5–5.1)
PROT SERPL-MCNC: 6.2 G/DL (ref 6–8.4)
PROTHROMBIN TIME: 11.8 SEC (ref 9–12.5)
RBC # BLD AUTO: 4.76 M/UL (ref 4.6–6.2)
SODIUM SERPL-SCNC: 140 MMOL/L (ref 136–145)
T4 FREE SERPL-MCNC: 1.06 NG/DL (ref 0.71–1.51)
TRIGL SERPL-MCNC: 132 MG/DL (ref 30–150)
TSH SERPL DL<=0.005 MIU/L-ACNC: 0.33 UIU/ML (ref 0.4–4)
WBC # BLD AUTO: 10.32 K/UL (ref 3.9–12.7)

## 2022-12-16 PROCEDURE — 61782 PR STEREOTACTIC COMP ASSIST PROC,CRANIAL,EXTRADURAL: ICD-10-PCS | Mod: ,,, | Performed by: STUDENT IN AN ORGANIZED HEALTH CARE EDUCATION/TRAINING PROGRAM

## 2022-12-16 PROCEDURE — 27201423 OPTIME MED/SURG SUP & DEVICES STERILE SUPPLY: Performed by: STUDENT IN AN ORGANIZED HEALTH CARE EDUCATION/TRAINING PROGRAM

## 2022-12-16 PROCEDURE — 80053 COMPREHEN METABOLIC PANEL: CPT

## 2022-12-16 PROCEDURE — 63600175 PHARM REV CODE 636 W HCPCS: Performed by: STUDENT IN AN ORGANIZED HEALTH CARE EDUCATION/TRAINING PROGRAM

## 2022-12-16 PROCEDURE — 31288 NASAL/SINUS ENDOSCOPY SURG: CPT | Mod: 51,LT,GC, | Performed by: STUDENT IN AN ORGANIZED HEALTH CARE EDUCATION/TRAINING PROGRAM

## 2022-12-16 PROCEDURE — 25000003 PHARM REV CODE 250

## 2022-12-16 PROCEDURE — 85610 PROTHROMBIN TIME: CPT

## 2022-12-16 PROCEDURE — 36415 COLL VENOUS BLD VENIPUNCTURE: CPT | Performed by: STUDENT IN AN ORGANIZED HEALTH CARE EDUCATION/TRAINING PROGRAM

## 2022-12-16 PROCEDURE — 83036 HEMOGLOBIN GLYCOSYLATED A1C: CPT

## 2022-12-16 PROCEDURE — 36000711: Performed by: STUDENT IN AN ORGANIZED HEALTH CARE EDUCATION/TRAINING PROGRAM

## 2022-12-16 PROCEDURE — C2625 STENT, NON-COR, TEM W/DEL SY: HCPCS | Performed by: STUDENT IN AN ORGANIZED HEALTH CARE EDUCATION/TRAINING PROGRAM

## 2022-12-16 PROCEDURE — 25000003 PHARM REV CODE 250: Performed by: NURSE ANESTHETIST, CERTIFIED REGISTERED

## 2022-12-16 PROCEDURE — 31290 PR NASAL/SINUS ENDOSCOPY,REPAIR CSF LEAK,ETHMOID: ICD-10-PCS | Mod: LT,GC,, | Performed by: STUDENT IN AN ORGANIZED HEALTH CARE EDUCATION/TRAINING PROGRAM

## 2022-12-16 PROCEDURE — 99291 CRITICAL CARE FIRST HOUR: CPT | Mod: ,,,

## 2022-12-16 PROCEDURE — 88305 TISSUE EXAM BY PATHOLOGIST: ICD-10-PCS | Mod: 26,,, | Performed by: PATHOLOGY

## 2022-12-16 PROCEDURE — 61782 SCAN PROC CRANIAL EXTRA: CPT | Mod: ,,, | Performed by: STUDENT IN AN ORGANIZED HEALTH CARE EDUCATION/TRAINING PROGRAM

## 2022-12-16 PROCEDURE — 84443 ASSAY THYROID STIM HORMONE: CPT

## 2022-12-16 PROCEDURE — 84100 ASSAY OF PHOSPHORUS: CPT

## 2022-12-16 PROCEDURE — 88305 TISSUE EXAM BY PATHOLOGIST: CPT | Mod: 26,,, | Performed by: PATHOLOGY

## 2022-12-16 PROCEDURE — 85730 THROMBOPLASTIN TIME PARTIAL: CPT

## 2022-12-16 PROCEDURE — 84439 ASSAY OF FREE THYROXINE: CPT

## 2022-12-16 PROCEDURE — 37000008 HC ANESTHESIA 1ST 15 MINUTES: Performed by: STUDENT IN AN ORGANIZED HEALTH CARE EDUCATION/TRAINING PROGRAM

## 2022-12-16 PROCEDURE — D9220A PRA ANESTHESIA: ICD-10-PCS | Mod: CRNA,,, | Performed by: NURSE ANESTHETIST, CERTIFIED REGISTERED

## 2022-12-16 PROCEDURE — 25000003 PHARM REV CODE 250: Performed by: STUDENT IN AN ORGANIZED HEALTH CARE EDUCATION/TRAINING PROGRAM

## 2022-12-16 PROCEDURE — 93010 EKG 12-LEAD: ICD-10-PCS | Mod: ,,, | Performed by: INTERNAL MEDICINE

## 2022-12-16 PROCEDURE — 86901 BLOOD TYPING SEROLOGIC RH(D): CPT

## 2022-12-16 PROCEDURE — 80061 LIPID PANEL: CPT

## 2022-12-16 PROCEDURE — D9220A PRA ANESTHESIA: Mod: CRNA,,, | Performed by: NURSE ANESTHETIST, CERTIFIED REGISTERED

## 2022-12-16 PROCEDURE — 99291 PR CRITICAL CARE, E/M 30-74 MINUTES: ICD-10-PCS | Mod: ,,,

## 2022-12-16 PROCEDURE — 63600175 PHARM REV CODE 636 W HCPCS: Performed by: NURSE ANESTHETIST, CERTIFIED REGISTERED

## 2022-12-16 PROCEDURE — 88304 TISSUE EXAM BY PATHOLOGIST: CPT | Performed by: PATHOLOGY

## 2022-12-16 PROCEDURE — 36000710: Performed by: STUDENT IN AN ORGANIZED HEALTH CARE EDUCATION/TRAINING PROGRAM

## 2022-12-16 PROCEDURE — 31288 PR NASAL/SINUS ENDOSCOPY,REMV TISS SPHENOID: ICD-10-PCS | Mod: 51,LT,GC, | Performed by: STUDENT IN AN ORGANIZED HEALTH CARE EDUCATION/TRAINING PROGRAM

## 2022-12-16 PROCEDURE — 93005 ELECTROCARDIOGRAM TRACING: CPT

## 2022-12-16 PROCEDURE — 93010 ELECTROCARDIOGRAM REPORT: CPT | Mod: ,,, | Performed by: INTERNAL MEDICINE

## 2022-12-16 PROCEDURE — 27800903 OPTIME MED/SURG SUP & DEVICES OTHER IMPLANTS: Performed by: STUDENT IN AN ORGANIZED HEALTH CARE EDUCATION/TRAINING PROGRAM

## 2022-12-16 PROCEDURE — D9220A PRA ANESTHESIA: Mod: ANES,,, | Performed by: ANESTHESIOLOGY

## 2022-12-16 PROCEDURE — 31290 NASAL/SINUS ENDOSCOPY SURG: CPT | Mod: LT,GC,, | Performed by: STUDENT IN AN ORGANIZED HEALTH CARE EDUCATION/TRAINING PROGRAM

## 2022-12-16 PROCEDURE — 31276 PR NASAL/SINUS ENDOSCOPY,EXPLOR FRONTAL SINUS: ICD-10-PCS | Mod: 51,LT,GC, | Performed by: STUDENT IN AN ORGANIZED HEALTH CARE EDUCATION/TRAINING PROGRAM

## 2022-12-16 PROCEDURE — 20000000 HC ICU ROOM

## 2022-12-16 PROCEDURE — 31276 NSL/SINS NDSC FRNT TISS RMVL: CPT | Mod: 51,LT,GC, | Performed by: STUDENT IN AN ORGANIZED HEALTH CARE EDUCATION/TRAINING PROGRAM

## 2022-12-16 PROCEDURE — C1894 INTRO/SHEATH, NON-LASER: HCPCS | Performed by: STUDENT IN AN ORGANIZED HEALTH CARE EDUCATION/TRAINING PROGRAM

## 2022-12-16 PROCEDURE — 88305 TISSUE EXAM BY PATHOLOGIST: CPT | Performed by: PATHOLOGY

## 2022-12-16 PROCEDURE — 83735 ASSAY OF MAGNESIUM: CPT

## 2022-12-16 PROCEDURE — 85025 COMPLETE CBC W/AUTO DIFF WBC: CPT

## 2022-12-16 PROCEDURE — 37000009 HC ANESTHESIA EA ADD 15 MINS: Performed by: STUDENT IN AN ORGANIZED HEALTH CARE EDUCATION/TRAINING PROGRAM

## 2022-12-16 PROCEDURE — D9220A PRA ANESTHESIA: ICD-10-PCS | Mod: ANES,,, | Performed by: ANESTHESIOLOGY

## 2022-12-16 PROCEDURE — 88313 SPECIAL STAINS GROUP 2: CPT | Mod: 59 | Performed by: PATHOLOGY

## 2022-12-16 PROCEDURE — 36415 COLL VENOUS BLD VENIPUNCTURE: CPT

## 2022-12-16 DEVICE — DURAMATRIX ONLAY 2X2 MEMBRANE: Type: IMPLANTABLE DEVICE | Site: NOSE | Status: FUNCTIONAL

## 2022-12-16 DEVICE — IMPLANT PROPEL MOMETASON 8MM: Type: IMPLANTABLE DEVICE | Site: NOSE | Status: FUNCTIONAL

## 2022-12-16 RX ORDER — KETAMINE HCL IN 0.9 % NACL 50 MG/5 ML
SYRINGE (ML) INTRAVENOUS
Status: DISCONTINUED | OUTPATIENT
Start: 2022-12-16 | End: 2022-12-16

## 2022-12-16 RX ORDER — TAMSULOSIN HYDROCHLORIDE 0.4 MG/1
0.4 CAPSULE ORAL DAILY
Status: DISCONTINUED | OUTPATIENT
Start: 2022-12-17 | End: 2022-12-21 | Stop reason: HOSPADM

## 2022-12-16 RX ORDER — VASOPRESSIN 20 [USP'U]/ML
INJECTION, SOLUTION INTRAMUSCULAR; SUBCUTANEOUS
Status: DISCONTINUED | OUTPATIENT
Start: 2022-12-16 | End: 2022-12-16

## 2022-12-16 RX ORDER — CEFAZOLIN SODIUM 1 G/3ML
INJECTION, POWDER, FOR SOLUTION INTRAMUSCULAR; INTRAVENOUS
Status: DISCONTINUED | OUTPATIENT
Start: 2022-12-16 | End: 2022-12-16

## 2022-12-16 RX ORDER — LEVOTHYROXINE SODIUM 50 UG/1
50 TABLET ORAL
Status: DISCONTINUED | OUTPATIENT
Start: 2022-12-17 | End: 2022-12-21 | Stop reason: HOSPADM

## 2022-12-16 RX ORDER — PHENYLEPHRINE HYDROCHLORIDE 10 MG/ML
INJECTION INTRAVENOUS
Status: DISCONTINUED | OUTPATIENT
Start: 2022-12-16 | End: 2022-12-16

## 2022-12-16 RX ORDER — PROPOFOL 10 MG/ML
VIAL (ML) INTRAVENOUS
Status: DISCONTINUED | OUTPATIENT
Start: 2022-12-16 | End: 2022-12-16

## 2022-12-16 RX ORDER — FLUORESCEIN 500 MG/ML
INJECTION INTRAVENOUS
Status: DISCONTINUED | OUTPATIENT
Start: 2022-12-16 | End: 2022-12-16 | Stop reason: HOSPADM

## 2022-12-16 RX ORDER — ONDANSETRON 2 MG/ML
INJECTION INTRAMUSCULAR; INTRAVENOUS
Status: DISCONTINUED | OUTPATIENT
Start: 2022-12-16 | End: 2022-12-16

## 2022-12-16 RX ORDER — SODIUM CHLORIDE 0.9 % (FLUSH) 0.9 %
3 SYRINGE (ML) INJECTION
Status: CANCELLED | OUTPATIENT
Start: 2022-12-16

## 2022-12-16 RX ORDER — DEXAMETHASONE SODIUM PHOSPHATE 4 MG/ML
INJECTION, SOLUTION INTRA-ARTICULAR; INTRALESIONAL; INTRAMUSCULAR; INTRAVENOUS; SOFT TISSUE
Status: DISCONTINUED | OUTPATIENT
Start: 2022-12-16 | End: 2022-12-16

## 2022-12-16 RX ORDER — FLUORESCEIN 500 MG/ML
5 INJECTION INTRAVENOUS ONCE
Status: DISCONTINUED | OUTPATIENT
Start: 2022-12-16 | End: 2022-12-18

## 2022-12-16 RX ORDER — FENTANYL CITRATE 50 UG/ML
INJECTION, SOLUTION INTRAMUSCULAR; INTRAVENOUS
Status: DISCONTINUED | OUTPATIENT
Start: 2022-12-16 | End: 2022-12-16

## 2022-12-16 RX ORDER — POLYETHYLENE GLYCOL 3350 17 G/17G
17 POWDER, FOR SOLUTION ORAL DAILY
Status: DISCONTINUED | OUTPATIENT
Start: 2022-12-17 | End: 2022-12-21 | Stop reason: HOSPADM

## 2022-12-16 RX ORDER — LIDOCAINE HYDROCHLORIDE 10 MG/ML
1 INJECTION, SOLUTION EPIDURAL; INFILTRATION; INTRACAUDAL; PERINEURAL ONCE
Status: DISCONTINUED | OUTPATIENT
Start: 2022-12-16 | End: 2022-12-18

## 2022-12-16 RX ORDER — SODIUM CHLORIDE 0.9 % (FLUSH) 0.9 %
10 SYRINGE (ML) INJECTION
Status: DISCONTINUED | OUTPATIENT
Start: 2022-12-16 | End: 2022-12-17

## 2022-12-16 RX ORDER — GABAPENTIN 300 MG/1
300 CAPSULE ORAL 3 TIMES DAILY PRN
Status: DISCONTINUED | OUTPATIENT
Start: 2022-12-16 | End: 2022-12-17

## 2022-12-16 RX ORDER — ATORVASTATIN CALCIUM 40 MG/1
80 TABLET, FILM COATED ORAL DAILY
Status: DISCONTINUED | OUTPATIENT
Start: 2022-12-17 | End: 2022-12-21 | Stop reason: HOSPADM

## 2022-12-16 RX ORDER — OXYMETAZOLINE HCL 0.05 %
SPRAY, NON-AEROSOL (ML) NASAL
Status: DISCONTINUED | OUTPATIENT
Start: 2022-12-16 | End: 2022-12-16 | Stop reason: HOSPADM

## 2022-12-16 RX ORDER — ACETAMINOPHEN 325 MG/1
650 TABLET ORAL EVERY 6 HOURS PRN
Status: DISCONTINUED | OUTPATIENT
Start: 2022-12-16 | End: 2022-12-17

## 2022-12-16 RX ORDER — ZOLPIDEM TARTRATE 12.5 MG/1
12.5 TABLET, FILM COATED, EXTENDED RELEASE ORAL NIGHTLY PRN
COMMUNITY

## 2022-12-16 RX ORDER — LIDOCAINE HYDROCHLORIDE AND EPINEPHRINE 10; 10 MG/ML; UG/ML
INJECTION, SOLUTION INFILTRATION; PERINEURAL
Status: DISCONTINUED | OUTPATIENT
Start: 2022-12-16 | End: 2022-12-16 | Stop reason: HOSPADM

## 2022-12-16 RX ORDER — SODIUM CHLORIDE 0.9 % (FLUSH) 0.9 %
10 SYRINGE (ML) INJECTION
Status: DISCONTINUED | OUTPATIENT
Start: 2022-12-16 | End: 2022-12-21 | Stop reason: HOSPADM

## 2022-12-16 RX ORDER — MUPIROCIN 20 MG/G
OINTMENT TOPICAL 2 TIMES DAILY
Status: DISCONTINUED | OUTPATIENT
Start: 2022-12-16 | End: 2022-12-19

## 2022-12-16 RX ORDER — LIDOCAINE HYDROCHLORIDE 20 MG/ML
INJECTION INTRAVENOUS
Status: DISCONTINUED | OUTPATIENT
Start: 2022-12-16 | End: 2022-12-16

## 2022-12-16 RX ORDER — ROCURONIUM BROMIDE 10 MG/ML
INJECTION, SOLUTION INTRAVENOUS
Status: DISCONTINUED | OUTPATIENT
Start: 2022-12-16 | End: 2022-12-16

## 2022-12-16 RX ORDER — ACETAMINOPHEN 10 MG/ML
INJECTION, SOLUTION INTRAVENOUS
Status: DISCONTINUED | OUTPATIENT
Start: 2022-12-16 | End: 2022-12-16

## 2022-12-16 RX ORDER — LABETALOL HCL 20 MG/4 ML
10 SYRINGE (ML) INTRAVENOUS
Status: DISCONTINUED | OUTPATIENT
Start: 2022-12-16 | End: 2022-12-17

## 2022-12-16 RX ADMIN — Medication 30 MG: at 05:12

## 2022-12-16 RX ADMIN — SODIUM CHLORIDE 0.4 MCG/KG/MIN: 9 INJECTION, SOLUTION INTRAVENOUS at 06:12

## 2022-12-16 RX ADMIN — PHENYLEPHRINE HYDROCHLORIDE 100 MCG: 10 INJECTION INTRAVENOUS at 05:12

## 2022-12-16 RX ADMIN — QUETIAPINE FUMARATE 12.5 MG: 25 TABLET ORAL at 11:12

## 2022-12-16 RX ADMIN — PHENYLEPHRINE HYDROCHLORIDE 100 MCG: 10 INJECTION INTRAVENOUS at 06:12

## 2022-12-16 RX ADMIN — MUPIROCIN: 20 OINTMENT TOPICAL at 10:12

## 2022-12-16 RX ADMIN — DEXAMETHASONE SODIUM PHOSPHATE 4 MG: 4 INJECTION, SOLUTION INTRAMUSCULAR; INTRAVENOUS at 05:12

## 2022-12-16 RX ADMIN — Medication 10 MG: at 06:12

## 2022-12-16 RX ADMIN — PROPOFOL 150 MG: 10 INJECTION, EMULSION INTRAVENOUS at 04:12

## 2022-12-16 RX ADMIN — SUGAMMADEX 200 MG: 100 INJECTION, SOLUTION INTRAVENOUS at 07:12

## 2022-12-16 RX ADMIN — FENTANYL CITRATE 50 MCG: 50 INJECTION INTRAMUSCULAR; INTRAVENOUS at 07:12

## 2022-12-16 RX ADMIN — VASOPRESSIN 1 UNITS: 20 INJECTION INTRAVENOUS at 06:12

## 2022-12-16 RX ADMIN — FENTANYL CITRATE 50 MCG: 50 INJECTION INTRAMUSCULAR; INTRAVENOUS at 06:12

## 2022-12-16 RX ADMIN — ROCURONIUM BROMIDE 50 MG: 10 INJECTION INTRAVENOUS at 04:12

## 2022-12-16 RX ADMIN — SODIUM CHLORIDE: 0.9 INJECTION, SOLUTION INTRAVENOUS at 04:12

## 2022-12-16 RX ADMIN — LIDOCAINE HYDROCHLORIDE 100 MG: 20 INJECTION INTRAVENOUS at 04:12

## 2022-12-16 RX ADMIN — CEFAZOLIN 2 G: 330 INJECTION, POWDER, FOR SOLUTION INTRAMUSCULAR; INTRAVENOUS at 05:12

## 2022-12-16 RX ADMIN — ROCURONIUM BROMIDE 20 MG: 10 INJECTION INTRAVENOUS at 06:12

## 2022-12-16 RX ADMIN — FENTANYL CITRATE 50 MCG: 50 INJECTION INTRAMUSCULAR; INTRAVENOUS at 04:12

## 2022-12-16 RX ADMIN — GABAPENTIN 300 MG: 300 CAPSULE ORAL at 11:12

## 2022-12-16 RX ADMIN — ACETAMINOPHEN 1000 MG: 10 INJECTION, SOLUTION INTRAVENOUS at 07:12

## 2022-12-16 RX ADMIN — ROCURONIUM BROMIDE 30 MG: 10 INJECTION INTRAVENOUS at 05:12

## 2022-12-16 RX ADMIN — ONDANSETRON 4 MG: 2 INJECTION INTRAMUSCULAR; INTRAVENOUS at 07:12

## 2022-12-16 NOTE — PROGRESS NOTES
Chart reviewed, and pre-op nursing care complete per orders. Safe surgery checklist complete. Wife @ bedside, pt belongings given to family. Waiting for H&P update, surgical consent, anesthesia consent, and site-marking prior to surgery.

## 2022-12-16 NOTE — BRIEF OP NOTE
[facility] - Surgery ([facility])  Brief Operative Note    SUMMARY     Surgery Date: 12/16/2022     Surgeon(s) and Role:  Panel 1:     * Paulino Zhu MD - Primary     * Marcel Palacios MD - Resident - Assisting     * Phani Stoddard MD - Resident - Assisting     * Ashish Martell MD - Resident - Assisting  Panel 2:     * Mariano Morgan DO - Primary        Pre-op Diagnosis:  Postoperative CSF leak [G97.82, G96.00]    Post-op Diagnosis:  Post-Op Diagnosis Codes:     * Postoperative CSF leak [G97.82, G96.00]    Procedure(s) (LRB):  REPAIR, ETHMOID SINUS, ENDOSCOPIC, FOR CSF LEAK (Left)  FESS, USING COMPUTER-ASSISTED NAVIGATION (Bilateral)  LUMBAR PUNCTURE (N/A)    Anesthesia: General    Operative Findings: Lumbar puncture performed after sterile preparation. Opening pressure observed to be 15mm Hg. Lumbar drain threaded to 7cm without resistance. 10cm CSF aspirated and mixed with 0.1cc fluoroscein and slowly administered through lumbar drain. Drainage catheter then connected to external CSF collection system and clamped. Patient tolerated procedure well. Turned over to care of ENT/Anesthesia.     Estimated Blood Loss: 1cc    Estimated Blood Loss has been documented.         Specimens:   Specimen (24h ago, onward)      None            [id number]

## 2022-12-16 NOTE — ANESTHESIA PREPROCEDURE EVALUATION
12/16/2022  Sarabjit Velasquez is a 67 y.o., male.      Pre-op Assessment    I have reviewed the Patient Summary Reports.       I have reviewed the Medications.     Review of Systems  Anesthesia Hx:  No problems with previous Anesthesia  History of prior surgery of interest to airway management or planning: Previous anesthesia: General   Social:  Non-Smoker, No Alcohol Use    Hematology/Oncology:  Hematology Normal   Oncology Normal     EENT/Dental:EENT/Dental Normal   Cardiovascular:   Exercise tolerance: good Hypertension, well controlled    Pulmonary:  Pulmonary Normal    Renal/:  Renal/ Normal     Hepatic/GI:  Hepatic/GI Normal    Musculoskeletal:  Musculoskeletal Normal    Neurological:  Neurology Normal    Endocrine:  Endocrine Normal    Dermatological:  Skin Normal    Psych:  Psychiatric Normal           Physical Exam  General: Well nourished, Cooperative, Alert and Oriented    Airway:  Mallampati: II   Mouth Opening: Normal  TM Distance: Normal  Tongue: Normal  Neck ROM: Normal ROM    Dental:  Intact        Anesthesia Plan  Type of Anesthesia, risks & benefits discussed:    Anesthesia Type: Gen ETT  Intra-op Monitoring Plan: Standard ASA Monitors  Post Op Pain Control Plan: multimodal analgesia and IV/PO Opioids PRN  Induction:  IV  Airway Plan: Video and Direct, Post-Induction  Informed Consent: Informed consent signed with the Patient and all parties understand the risks and agree with anesthesia plan.  All questions answered.   ASA Score: 2    Ready For Surgery From Anesthesia Perspective.     .

## 2022-12-17 PROBLEM — Z98.890 STATUS POST FUNCTIONAL ENDOSCOPIC SINUS SURGERY (FESS): Status: ACTIVE | Noted: 2022-12-17

## 2022-12-17 PROCEDURE — 94761 N-INVAS EAR/PLS OXIMETRY MLT: CPT

## 2022-12-17 PROCEDURE — 63600175 PHARM REV CODE 636 W HCPCS

## 2022-12-17 PROCEDURE — 20000000 HC ICU ROOM

## 2022-12-17 PROCEDURE — 25000003 PHARM REV CODE 250

## 2022-12-17 PROCEDURE — 25000003 PHARM REV CODE 250: Performed by: STUDENT IN AN ORGANIZED HEALTH CARE EDUCATION/TRAINING PROGRAM

## 2022-12-17 PROCEDURE — 99233 SBSQ HOSP IP/OBS HIGH 50: CPT | Mod: ,,, | Performed by: PSYCHIATRY & NEUROLOGY

## 2022-12-17 PROCEDURE — 99233 PR SUBSEQUENT HOSPITAL CARE,LEVL III: ICD-10-PCS | Mod: ,,, | Performed by: PSYCHIATRY & NEUROLOGY

## 2022-12-17 RX ORDER — METOCLOPRAMIDE HYDROCHLORIDE 5 MG/ML
10 INJECTION INTRAMUSCULAR; INTRAVENOUS ONCE
Status: COMPLETED | OUTPATIENT
Start: 2022-12-17 | End: 2022-12-17

## 2022-12-17 RX ORDER — QUETIAPINE FUMARATE 25 MG/1
25 TABLET, FILM COATED ORAL NIGHTLY PRN
Status: DISCONTINUED | OUTPATIENT
Start: 2022-12-17 | End: 2022-12-18

## 2022-12-17 RX ORDER — GABAPENTIN 300 MG/1
300 CAPSULE ORAL 3 TIMES DAILY
Status: DISCONTINUED | OUTPATIENT
Start: 2022-12-17 | End: 2022-12-18

## 2022-12-17 RX ORDER — ACETAMINOPHEN 325 MG/1
650 TABLET ORAL EVERY 6 HOURS PRN
Status: DISCONTINUED | OUTPATIENT
Start: 2022-12-17 | End: 2022-12-17

## 2022-12-17 RX ORDER — METHOCARBAMOL 500 MG/1
500 TABLET, FILM COATED ORAL 4 TIMES DAILY
Status: DISCONTINUED | OUTPATIENT
Start: 2022-12-17 | End: 2022-12-18

## 2022-12-17 RX ORDER — OXYCODONE HYDROCHLORIDE 5 MG/1
5 TABLET ORAL EVERY 6 HOURS PRN
Status: DISCONTINUED | OUTPATIENT
Start: 2022-12-17 | End: 2022-12-18

## 2022-12-17 RX ORDER — LABETALOL HCL 20 MG/4 ML
10 SYRINGE (ML) INTRAVENOUS EVERY 6 HOURS PRN
Status: DISCONTINUED | OUTPATIENT
Start: 2022-12-17 | End: 2022-12-21 | Stop reason: HOSPADM

## 2022-12-17 RX ORDER — ACETAMINOPHEN 325 MG/1
650 TABLET ORAL EVERY 6 HOURS PRN
Status: DISCONTINUED | OUTPATIENT
Start: 2022-12-17 | End: 2022-12-21 | Stop reason: HOSPADM

## 2022-12-17 RX ORDER — MAGNESIUM SULFATE HEPTAHYDRATE 40 MG/ML
2 INJECTION, SOLUTION INTRAVENOUS ONCE
Status: COMPLETED | OUTPATIENT
Start: 2022-12-17 | End: 2022-12-17

## 2022-12-17 RX ADMIN — GABAPENTIN 300 MG: 300 CAPSULE ORAL at 09:12

## 2022-12-17 RX ADMIN — OXYCODONE 5 MG: 5 TABLET ORAL at 10:12

## 2022-12-17 RX ADMIN — ACETAMINOPHEN 650 MG: 325 TABLET ORAL at 01:12

## 2022-12-17 RX ADMIN — METHOCARBAMOL 500 MG: 500 TABLET ORAL at 09:12

## 2022-12-17 RX ADMIN — POLYETHYLENE GLYCOL 3350 17 G: 17 POWDER, FOR SOLUTION ORAL at 09:12

## 2022-12-17 RX ADMIN — QUETIAPINE FUMARATE 25 MG: 25 TABLET ORAL at 09:12

## 2022-12-17 RX ADMIN — ATORVASTATIN CALCIUM 80 MG: 40 TABLET, FILM COATED ORAL at 09:12

## 2022-12-17 RX ADMIN — GABAPENTIN 300 MG: 300 CAPSULE ORAL at 03:12

## 2022-12-17 RX ADMIN — MUPIROCIN: 20 OINTMENT TOPICAL at 09:12

## 2022-12-17 RX ADMIN — METHOCARBAMOL 500 MG: 500 TABLET ORAL at 05:12

## 2022-12-17 RX ADMIN — SODIUM CHLORIDE 500 ML: 0.9 INJECTION, SOLUTION INTRAVENOUS at 02:12

## 2022-12-17 RX ADMIN — METHOCARBAMOL 500 MG: 500 TABLET ORAL at 02:12

## 2022-12-17 RX ADMIN — MAGNESIUM SULFATE 2 G: 2 INJECTION INTRAVENOUS at 03:12

## 2022-12-17 RX ADMIN — METOCLOPRAMIDE 10 MG: 5 INJECTION, SOLUTION INTRAMUSCULAR; INTRAVENOUS at 02:12

## 2022-12-17 RX ADMIN — TAMSULOSIN HYDROCHLORIDE 0.4 MG: 0.4 CAPSULE ORAL at 09:12

## 2022-12-17 RX ADMIN — LEVOTHYROXINE SODIUM 50 MCG: 50 TABLET ORAL at 06:12

## 2022-12-17 RX ADMIN — OXYCODONE 5 MG: 5 TABLET ORAL at 05:12

## 2022-12-17 RX ADMIN — ACETAMINOPHEN 650 MG: 325 TABLET ORAL at 09:12

## 2022-12-17 NOTE — ASSESSMENT & PLAN NOTE
68 y/o m w/ pmhx of coronary artery atheroma, HTN, hypothyroidism, urinary retention, frequent sinus infections presents post op CSF leak repair. CSF leak after sinus surgery approximately 1 week prior to admission.     -Admit to [facility] for close monitoring in post op setting  -Neuro checks q1hr  -Vital signs q1hr  -lumbar drain in place, draining 5cc /hr per ENT and NSGY   -NSGY and ENT following   -SBP goals <160  -PRN labetalol, hydralazine  -HOB restriction to <30 degrees  -No post op imaging per ENT  -PT/INR, aPTT pending  -CBC, CMP, Mag, Phos pending, then daily   -SLP  -PT/OT once HOB restrictions no longer required

## 2022-12-17 NOTE — PLAN OF CARE
[facility] Care Plan    POC reviewed with Sarabjit Recio Ron and family at 1400. Pt verbalized understanding. Questions and concerns addressed. No acute events today. Pt progressing toward goals. Will continue to monitor. See below and flowsheets for full assessment and VS info.             Is this a stroke patient? no    Neuro:  Alberto Coma Scale  Best Eye Response: 4-->(E4) spontaneous  Best Motor Response: 6-->(M6) obeys commands  Best Verbal Response: 5-->(V5) oriented  Hazen Coma Scale Score: 15  Assessment Qualifiers: patient not sedated/intubated  Pupil PERRLA: yes     24 hr Temp:  [98.2 °F (36.8 °C)-98.9 °F (37.2 °C)]     CV:   Rhythm: normal sinus rhythm  BP goals:   SBP < 160  MAP > 65    Resp:           Plan: N/A    GI/:     Diet/Nutrition Received: regular  Last Bowel Movement: 12/17/22  Voiding Characteristics: external catheter    Intake/Output Summary (Last 24 hours) at 12/17/2022 1717  Last data filed at 12/17/2022 1700  Gross per 24 hour   Intake 1650 ml   Output 2060 ml   Net -410 ml          Labs/Accuchecks:  Recent Labs   Lab 12/16/22 2037   WBC 10.32   RBC 4.76   HGB 15.0   HCT 45.1         Recent Labs   Lab 12/16/22 2037      K 4.6   CO2 24      BUN 16   CREATININE 0.8   ALKPHOS 86   ALT 66*   AST 31   BILITOT 1.0      Recent Labs   Lab 12/16/22 2037   INR 1.1   APTT 24.3    No results for input(s): CPK, CPKMB, TROPONINI, MB in the last 168 hours.    Electrolytes: N/A - electrolytes WDL  Accuchecks: none    Gtts:      LDA/Wounds:  Lines/Drains/Airways       Drain  Duration                  Lumbar Drain 12/16/22 2000 <1 day    Male External Urinary Catheter 12/16/22 2030 Medium <1 day              Peripheral Intravenous Line  Duration                  Peripheral IV - Single Lumen 12/16/22 1402 18 G Posterior;Right Forearm 1 day         Peripheral IV - Single Lumen 12/16/22 2300 22 G Anterior;Right Forearm <1 day                  Wounds: No  Wound care consulted: No

## 2022-12-17 NOTE — HPI
Mr. Velasquez is a 68 y/o male w/ pmhx of coronary artery atheroma, HTN, hypothyroidism, urinary retention, frequent sinus infections and recent sinus surgery approximately 1 week who present post op from ENT/NeuroSurgery operation to intranasally repair residual CSF leak. Lumbar drain in place to drain at 5cc/hr. Admit to [facility] for close neurologic monitoring in post op period and while lumbar drain in place.

## 2022-12-17 NOTE — HOSPITAL COURSE
12/17/2022: SHALOM. POD1 s/p ethmoid CSF fistula repair. CTH with expected post-op changes. LD draining 5cc/hr.   12/18/2022: SHALOM. POD2 s/p ethmoid CSF fistula repair. LD draining 5cc/hr with tentative plans to clamp tomorrow. Pain medication regimen adjusted. SQH started.   12/19/2022 SHALOM, lumbar drain clamped, add romi doc.   12/20: drain to be pulled today, then ambulate, the TTF  
12/17: Lumbar drain placed for ethmoidal CSF fistula repair with ENT 12/16. LD draining 5cc / hr. Intact on exam. 124-173 SBP, AF  12/18: AF, 115-169 SBP. NAEON. LD day 2/3, 5cc/hr. Exam stable  12/19: NAEON. AFVSS. Exam stable. LD functional. Plan for clamp today and pull tomorrow.   12/20: SHALOM, neuro stable. No headaches, LD removed this morning, OK for TTF today to ENT  
DISPLAY PLAN FREE TEXT

## 2022-12-17 NOTE — ANESTHESIA PROCEDURE NOTES
Intubation    Date/Time: 12/16/2022 4:56 PM  Performed by: Shana Dodd CRNA  Authorized by: Adebayo Crowe MD     Intubation:     Induction:  Intravenous    Intubated:  Postinduction    Mask Ventilation:  Easy mask    Attempts:  1    Attempted By:  CRNA    Method of Intubation:  Video laryngoscopy    Blade:  Menjivar 3    Laryngeal View Grade: Grade I - full view of cords      Difficult Airway Encountered?: No      Complications:  None    Airway Device:  Oral endotracheal tube    Airway Device Size:  7.5    Style/Cuff Inflation:  Cuffed (inflated to minimal occlusive pressure)    Secured at:  The lips    Placement Verified By:  Capnometry    Findings Post-Intubation:  BS equal bilateral and atraumatic/condition of teeth unchanged

## 2022-12-17 NOTE — BRIEF OP NOTE
[facility] - Surgery (2nd Fl)  Brief Operative Note    SUMMARY     Surgery Date: 12/16/2022     Surgeon(s) and Role:  Panel 1:     * Ac Zhu MD - Primary     * Lisa Cherry MD - Resident - Assisting     * Marcel Palacios MD - Resident - Assisting     * Phani Stoddard MD - Resident - Assisting     * Ashish Martell MD - Resident - Assisting  Panel 2:     * Mariano Morgan DO - Primary        Pre-op Diagnosis:  Postoperative CSF leak [G97.82, G96.00]    Post-op Diagnosis:  Post-Op Diagnosis Codes:     * Postoperative CSF leak [G97.82, G96.00]    Procedure(s) (LRB):  REPAIR, ETHMOID SINUS, ENDOSCOPIC, FOR CSF LEAK (Left)  FESS, USING COMPUTER-ASSISTED NAVIGATION (Bilateral)  LUMBAR PUNCTURE (N/A)    Anesthesia: General    Operative Findings:   CSF leak noted at L posterior ethmoid  Repaired with L middle turbinate flap     Estimated Blood Loss: * No values recorded between 12/16/2022  5:07 PM and 12/16/2022  7:30 PM *    Estimated Blood Loss has not been documented. EBL = 15cc.         Specimens:   Specimen (24h ago, onward)       Start     Ordered    Pending  Specimen to Pathology, Surgery ENT  Once        Comments: Pre-op Diagnosis: Postoperative CSF leak [G97.82, G96.00]Procedure(s):REPAIR, ETHMOID SINUS, ENDOSCOPIC, FOR CSF LEAKFESS, USING COMPUTER-ASSISTED NAVIGATIONLUMBAR PUNCTURE Number of specimens: 1Name of specimens: 1. LEFT FRONTAL SINUS (PERMANENT)     References:    Click here for ordering Quick Tip   Question Answer Comment   Procedure Type: ENT    Which provider would you like to cc? AC ZHU.    Release to patient Immediate        Pending                    [id number]

## 2022-12-17 NOTE — H&P
[facility] - Neuro Critical Care  Neurocritical Care  History & Physical    Admit Date: 12/16/2022  Service Date: 12/16/2022  Length of Stay: 0    Subjective:     Chief Complaint: Postoperative CSF leak    History of Present Illness: Mr. Velasquez is a 68 y/o male w/ pmhx of coronary artery atheroma, HTN, hypothyroidism, urinary retention, frequent sinus infections and recent sinus surgery approximately 1 week who present post op from ENT/NeuroSurgery operation to intranasally repair residual CSF leak. Lumbar drain in place to drain at 5cc/hr. Admit to [facility] for close neurologic monitoring in post op period and while lumbar drain in place.       Past Medical History:   Diagnosis Date    Essential (primary) hypertension     Other hyperlipidemia      History reviewed. No pertinent surgical history.   No current facility-administered medications on file prior to encounter.     Current Outpatient Medications on File Prior to Encounter   Medication Sig Dispense Refill    cefUROXime (CEFTIN) 500 MG tablet Take 500 mg by mouth 2 (two) times daily.      ezetimibe (ZETIA) 10 mg tablet Take 5 mg by mouth every evening.      finasteride (PROSCAR) 5 mg tablet Take 5 mg by mouth every morning.      gabapentin (NEURONTIN) 300 MG capsule Take 300 mg by mouth 3 (three) times daily as needed.      levothyroxine (SYNTHROID) 50 MCG tablet Take 50 mcg by mouth before breakfast.      QUEtiapine (SEROQUEL) 25 MG Tab Take 18.25 mg by mouth every evening.      rosuvastatin (CRESTOR) 20 MG tablet Take 20 mg by mouth every evening.      tamsulosin (FLOMAX) 0.4 mg Cap Take 0.4 mg by mouth every morning.      telmisartan (MICARDIS) 20 MG Tab Take by mouth every evening.      tiZANidine 4 mg Cap Take 4 mg by mouth daily as needed.      zolpidem (AMBIEN CR) 12.5 MG CR tablet Take 12.5 mg by mouth nightly as needed for Insomnia.      aspirin (ECOTRIN) 81 MG EC tablet Take 81 mg by mouth.      meloxicam (MOBIC) 15 MG tablet Take 15 mg by  mouth.      tadalafiL (CIALIS) 5 MG tablet Take 5 mg by mouth once daily.        Allergies: Patient has no known allergies.    History reviewed. No pertinent family history.  Social History     Tobacco Use    Smoking status: Never    Smokeless tobacco: Never     Review of Systems   Constitutional:  Negative for chills and fever.   HENT:  Positive for congestion (mild). Negative for drooling, facial swelling, nosebleeds, postnasal drip, rhinorrhea, sinus pressure, sinus pain, sore throat, trouble swallowing and voice change.    Eyes:  Negative for photophobia and visual disturbance.   Respiratory:  Negative for cough and shortness of breath.    Cardiovascular:  Negative for chest pain and palpitations.   Gastrointestinal:  Negative for abdominal pain, nausea and vomiting.   Genitourinary:  Positive for difficulty urinating (history of retention). Negative for dysuria.   Musculoskeletal:  Negative for neck pain and neck stiffness.   Neurological:  Negative for facial asymmetry and weakness.   Psychiatric/Behavioral:  Negative for agitation and confusion.    Objective:     Vitals:    Temp: 98.3 °F (36.8 °C)  Pulse: 104  BP: (!) 173/95  Resp: 18  SpO2: 96 %    Temp  Min: 98.3 °F (36.8 °C)  Max: 98.3 °F (36.8 °C)  Pulse  Min: 104  Max: 104  BP  Min: 173/95  Max: 173/95  Resp  Min: 18  Max: 18  SpO2  Min: 96 %  Max: 96 %    No intake/output data recorded.           Physical Exam    Constitutional: Well-nourished, well-developed.   No obvious distress. Face mask in place.   Patient lying in bed comfortably with HOB at 30 degrees.     Eyes: Clear conjunctiva. Anicteric. No discharge.   Lids without lesions.    HEENT: Normocephalic, atraumatic. Minimal dried blood around nare.   Nose and external ears atraumatic.   Mucus membranes are moist.     Cardio: Regular rate and rhythm on telemetry monitor.    Respiratory: Regular effort, no respiratory distress. Face mask on, O2 sats of 96% on monitor.     GI: Soft,  non-distended, non-tender.    Skin: No erythema, rash, or wounds to exposed skin.     Neuro:E4 V5 M6    Awake, alert, and oriented to self, time, place, and situation. Patient is answering all questions appropriately and following all commands briskly.   No facial droop. EOMI. PERRL.    Motor:   MARCUM spontaneously and against gravity   RUE: 5/5  LUE: 5/5  RLE: 5/5  LLE: 5/5    Sensory:  Sensation grossly intact      Today I personally reviewed pertinent medications, lines/drains/airways, laboratory results, notably: Lumbar drain, exam     Assessment/Plan:     Neuro  * Postoperative CSF leak  68 y/o m w/ pmhx of coronary artery atheroma, HTN, hypothyroidism, urinary retention, frequent sinus infections presents post op CSF leak repair. CSF leak after sinus surgery approximately 1 week prior to admission.     -Admit to [facility] for close monitoring in post op setting  -Neuro checks q1hr  -Vital signs q1hr  -lumbar drain in place, draining 5cc /hr per ENT and NSGY   -NSGY and ENT following   -SBP goals <160  -PRN labetalol, hydralazine  -HOB restriction to <30 degrees  -No post op imaging per ENT  -PT/INR, aPTT pending  -CBC, CMP, Mag, Phos pending, then daily   -SLP  -PT/OT once HOB restrictions no longer required     Cardiac/Vascular  Primary hypertension  -SBP goal <160   -PRN labetalol, hydralazine  -EKG pending      Coronary artery atheroma  -Holding daily ASA in initial post op period   -EKG and Echo pending       Renal/  Urinary retention  -bladder scan q6   -continue home flomax  -reports history of incomplete bladder emptying    Endocrine  Other specified hypothyroidism  -TSH level pending   -continue home synthroid           The patient is being Prophylaxed for:  Venous Thromboembolism with: Mechanical  Stress Ulcer with: Not Applicable   Ventilator Pneumonia with: not applicable    Activity Orders          Turn patient starting at 12/16 2200    Elevate HOB starting at 12/16 2017    Diet NPO: NPO starting at  12/16 2017        Full Code     Critical condition in that Patient has a condition that poses threat to life and bodily function: immediate post op period for CSF leak repair, lumbar drain in place, HTN, hypothyroid,      35 minutes of Critical care time was spent personally by me on the following activities: development of treatment plan with patient or surrogate and bedside caregivers, discussions with consultants, evaluation of patient's response to treatment, examination of patient, ordering and performing treatments and interventions, ordering and review of laboratory studies, ordering and review of radiographic studies, pulse oximetry, antibiotic titration if applicable, vasopressor titration if applicable, re-evaluation of patient's condition. This critical care time did not overlap with that of any other provider or involve time for any procedures. There is high probability for acute neurological change leading to clinical and possibly life-threatening deterioration requiring highest level of physician preparedness for urgent intervention.      Soumya Ram PA-C  Neurocritical Care  [facility] - Neuro Critical Care

## 2022-12-17 NOTE — SUBJECTIVE & OBJECTIVE
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 LD draining 5cc / hr. Intact on exam. 124-173 SBP, AF    Medications:  Continuous Infusions:  Scheduled Meds:   atorvastatin  80 mg Oral Daily    fluorescein  5 mL Intravenous Once    levothyroxine  50 mcg Oral Before breakfast    LIDOcaine (PF) 10 mg/ml (1%)  1 mL Intradermal Once    mupirocin   Nasal BID    polyethylene glycol  17 g Oral Daily    tamsulosin  0.4 mg Oral Daily     PRN Meds:acetaminophen, ceFAZolin (ANCEF) IVPB, gabapentin, labetalol, QUEtiapine, sodium chloride 0.9%, sodium chloride 0.9%     Review of Systems  Objective:     Weight: 89 kg (196 lb 3.4 oz)  Body mass index is 28.98 kg/m².  Vital Signs (Most Recent):  Temp: 98.4 °F (36.9 °C) (12/17/22 0305)  Pulse: 70 (12/17/22 0605)  Resp: 17 (12/17/22 0605)  BP: 130/75 (12/17/22 0605)  SpO2: 97 % (12/17/22 0605) Vital Signs (24h Range):  Temp:  [98.2 °F (36.8 °C)-98.9 °F (37.2 °C)] 98.4 °F (36.9 °C)  Pulse:  [] 70  Resp:  [17-26] 17  SpO2:  [93 %-97 %] 97 %  BP: (124-173)/(71-95) 130/75                     Male External Urinary Catheter 12/16/22 2030 Medium (Active)   Collection Container Urimeter 12/17/22 0305   Securement Method secured to top of thigh w/ tape 12/17/22 0305   Skin no redness;no breakdown 12/17/22 0305   Tolerance no signs/symptoms of discomfort 12/17/22 0305   Output (mL) 350 mL 12/17/22 0305   Catheter Change Date 12/16/22 12/17/22 0305   Catheter Change Time 2030 12/17/22 0305            Lumbar Drain 12/16/22 2000 (Active)   Drain Status Clamped 12/17/22 0305   Drain Level (cm) 10 cm 12/16/22 2035   Level to other (see comment) 12/17/22 0305   CSF Color Other (Comment);Yellow 12/17/22 0305   Site Description Healing 12/17/22 0305   Dressing Status Intact;New drainage 12/17/22 0305   Interventions HOB degrees (see comment) 12/17/22 0305   Output (mL) 5 mL 12/17/22 0605       Physical Exam    Neurosurgery Physical  Exam  E4V5M6  Aox3  Cni, PERRL  Fcx4 AG  SILT    No drift  LD site c/d/I with tegaderms securing  Significant Labs:  Recent Labs   Lab 12/16/22 2037         K 4.6      CO2 24   BUN 16   CREATININE 0.8   CALCIUM 8.4*   MG 2.0     Recent Labs   Lab 12/16/22 2037   WBC 10.32   HGB 15.0   HCT 45.1        Recent Labs   Lab 12/16/22 2037   INR 1.1   APTT 24.3     Microbiology Results (last 7 days)       ** No results found for the last 168 hours. **          All pertinent labs from the last 24 hours have been reviewed.    Significant Diagnostics:  I have reviewed and interpreted all pertinent imaging results/findings within the past 24 hours.  X-Ray Chest AP Single View    Result Date: 12/16/2022  Prominence of the pulmonary vascular with bilateral pattern of infiltrates as above. Electronically signed by: Avery White Date:    12/16/2022 Time:    23:43

## 2022-12-17 NOTE — ASSESSMENT & PLAN NOTE
History of   - Bladder scan q6h, PRN straight cath   - Continue home flomax  - Reports history of incomplete bladder emptying

## 2022-12-17 NOTE — ASSESSMENT & PLAN NOTE
67 y.o M with prior FESS and development of CSF leak, s/p lumbar drain, defect noted to be at L cribriform plate, s/p  middle turbinate flap on 12/16. Postop CTH appropriate.     - CSF leak precautions   - Sinonasal precautions; no nose blowing, sneeze with mouth open   - No straining, stool softeners   - HOB at 30 degrees   - Keep LD in place at 5cc/hr

## 2022-12-17 NOTE — ANESTHESIA POSTPROCEDURE EVALUATION
Anesthesia Post Evaluation    Patient: Sarabjit Velasquez    Procedure(s) Performed: Procedure(s) (LRB):  REPAIR, ETHMOID SINUS, ENDOSCOPIC, FOR CSF LEAK (Left)  FESS, USING COMPUTER-ASSISTED NAVIGATION (Bilateral)  LUMBAR PUNCTURE (N/A)    Final Anesthesia Type: general      Patient location during evaluation: ICU  Patient participation: Yes- Able to Participate  Level of consciousness: awake and alert  Post-procedure vital signs: reviewed and stable  Pain management: adequate  Airway patency: patent  JAYNE mitigation strategies: Extubation while patient is awake and Multimodal analgesia  PONV status at discharge: No PONV  Anesthetic complications: no      Cardiovascular status: stable  Respiratory status: unassisted and spontaneous ventilation  Hydration status: euvolemic  Follow-up not needed.          Vitals Value Taken Time   /81 12/16/22 2102   Temp  12/16/22 2122   Pulse 88 12/16/22 2121   Resp 20 12/16/22 2121   SpO2 96 % 12/16/22 2121   Vitals shown include unvalidated device data.      No case tracking events are documented in the log.      Pain/Ishmael Score: No data recorded

## 2022-12-17 NOTE — PROGRESS NOTES
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 LD draining 5cc / hr. Intact on exam. 124-173 SBP, AF    Medications:  Continuous Infusions:  Scheduled Meds:   atorvastatin  80 mg Oral Daily    fluorescein  5 mL Intravenous Once    levothyroxine  50 mcg Oral Before breakfast    LIDOcaine (PF) 10 mg/ml (1%)  1 mL Intradermal Once    mupirocin   Nasal BID    polyethylene glycol  17 g Oral Daily    tamsulosin  0.4 mg Oral Daily     PRN Meds:acetaminophen, ceFAZolin (ANCEF) IVPB, gabapentin, labetalol, QUEtiapine, sodium chloride 0.9%, sodium chloride 0.9%     Review of Systems  Objective:     Weight: 89 kg (196 lb 3.4 oz)  Body mass index is 28.98 kg/m².  Vital Signs (Most Recent):  Temp: 98.4 °F (36.9 °C) (12/17/22 0305)  Pulse: 70 (12/17/22 0605)  Resp: 17 (12/17/22 0605)  BP: 130/75 (12/17/22 0605)  SpO2: 97 % (12/17/22 0605) Vital Signs (24h Range):  Temp:  [98.2 °F (36.8 °C)-98.9 °F (37.2 °C)] 98.4 °F (36.9 °C)  Pulse:  [] 70  Resp:  [17-26] 17  SpO2:  [93 %-97 %] 97 %  BP: (124-173)/(71-95) 130/75                     Male External Urinary Catheter 12/16/22 2030 Medium (Active)   Collection Container Urimeter 12/17/22 0305   Securement Method secured to top of thigh w/ tape 12/17/22 0305   Skin no redness;no breakdown 12/17/22 0305   Tolerance no signs/symptoms of discomfort 12/17/22 0305   Output (mL) 350 mL 12/17/22 0305   Catheter Change Date 12/16/22 12/17/22 0305   Catheter Change Time 2030 12/17/22 0305            Lumbar Drain 12/16/22 2000 (Active)   Drain Status Clamped 12/17/22 0305   Drain Level (cm) 10 cm 12/16/22 2035   Level to other  (see comment) 12/17/22 0305   CSF Color Other (Comment);Yellow 12/17/22 0305   Site Description Healing 12/17/22 0305   Dressing Status Intact;New drainage 12/17/22 0305   Interventions HOB degrees (see comment) 12/17/22 0305   Output (mL) 5 mL 12/17/22 0605       Physical Exam    Neurosurgery Physical Exam  E4V5M6  Aox3  Cni, PERRL  Fcx4 AG  SILT    No drift  LD site c/d/I with tegaderms securing  Significant Labs:  Recent Labs   Lab 12/16/22 2037         K 4.6      CO2 24   BUN 16   CREATININE 0.8   CALCIUM 8.4*   MG 2.0     Recent Labs   Lab 12/16/22 2037   WBC 10.32   HGB 15.0   HCT 45.1        Recent Labs   Lab 12/16/22 2037   INR 1.1   APTT 24.3     Microbiology Results (last 7 days)       ** No results found for the last 168 hours. **          All pertinent labs from the last 24 hours have been reviewed.    Significant Diagnostics:  I have reviewed and interpreted all pertinent imaging results/findings within the past 24 hours.  X-Ray Chest AP Single View    Result Date: 12/16/2022  Prominence of the pulmonary vascular with bilateral pattern of infiltrates as above. Electronically signed by: Avery White Date:    12/16/2022 Time:    23:43       Assessment/Plan:     Status post functional endoscopic sinus surgery (FESS)  67M with LD placed on 12/16/22 for ethmoidal CSF fistual repair with ENT    Plan:  --icu   --q1 nc/vs icu, q2 stepdown, q4 floor  --all labs and significant diagnostics reviewed  --LD @ 5cc/hr x3 days (Day 1/3)  --please notfiy nsgy of any acute neurological decline    Dispo: icu while LD in place; TTF to ENT after LD removed            Ashish Martell MD  Neurosurgery  [facility]

## 2022-12-17 NOTE — CONSULTS
"  [facility]  Adult Nutrition  Consult Note    SUMMARY     Recommendations    1. Continue current Regular diet.   2. RD to monitor & follow-up.    Goals: Meet % EEN, EPN by RD f/u date  Nutrition Goal Status: new  Communication of RD Recs: reviewed with RN    Assessment and Plan    No nutritional dx at this time.    Reason for Assessment    Reason For Assessment: consult  Diagnosis: other (see comments) (CSF leak)  Relevant Medical History: HTN  Interdisciplinary Rounds: did not attend    General Information Comments: Diet advanced to Regular this AM. PTA - pt reports good appetite & UBW of 200#; chart review confirms. Appears nourished w/ no indicators of malnutrition. S/p LP, CSF leak repair.  Nutrition Discharge Planning: Adequate PO intake    Nutrition/Diet History    Spiritual, Cultural Beliefs, Orthodox Practices, Values that Affect Care: no  Factors Affecting Nutritional Intake: None identified at this time    Anthropometrics    Temp: 98.4 °F (36.9 °C)  Height Method: Stated  Height: 5' 9" (175.3 cm)  Height (inches): 69 in  Weight Method: Stated  Weight: 89 kg (196 lb 3.4 oz)  Weight (lb): 196.21 lb  Ideal Body Weight (IBW), Male: 160 lb  % Ideal Body Weight, Male (lb): 122.63 %  BMI (Calculated): 29  BMI Grade: 25 - 29.9 - overweight    Lab/Procedures/Meds    Pertinent Labs Reviewed: reviewed  Pertinent Medications Reviewed: reviewed  Pertinent Medications Comments: Statin    Estimated/Assessed Needs    Weight Used For Calorie Calculations: 89 kg (196 lb 3.4 oz)    Energy Calorie Requirements (kcal): 1974 kcal/d  Energy Need Method: [place]-St Jeor (1.2 PAL)    Protein Requirements: 89 g/d (1 g/kg)  Weight Used For Protein Calculations: 89 kg (196 lb 3.4 oz)    Estimated Fluid Requirement Method: other (see comments) (Per MD or 1 mL/kcal)  RDA Method (mL): 1974    Nutrition Prescription Ordered    Current Diet Order: Regular    Evaluation of Received Nutrient/Fluid Intake    I/O: " -1.5L since admit    Comments: LBM: 12/15    Nutrition Risk    Level of Risk/Frequency of Follow-up:  (1x/week)     Monitor and Evaluation    Food and Nutrient Intake: energy intake, food and beverage intake  Food and Nutrient Adminstration: diet order  Physical Activity and Function: nutrition-related ADLs and IADLs  Anthropometric Measurements: weight, weight change  Biochemical Data, Medical Tests and Procedures: inflammatory profile, lipid profile, glucose/endocrine profile, gastrointestinal profile, electrolyte and renal panel  Nutrition-Focused Physical Findings: overall appearance     Nutrition Follow-Up    RD Follow-up?: Yes

## 2022-12-17 NOTE — OP NOTE
12/16/2022     PREOPERATIVE DIAGNOSIS(ES):    1.   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  2.   Chronic sinusitis.     POSTOPERATIVE DIAGNOSIS(ES):    1.   Same.     PROCEDURE:   1.   Endoscopic CSF leak repair of the left cribriform plate.  2.   Left ethmoidectomy, complete.  3.   Left sphenoidotomy with removal of tissue.  4.   Left frontal sinusotomy.  5.   Left middle turbinate flap.  6.   Use of [employer] neuronavigation.     ANESTHESIA:  General endotracheal.     SURGEON(S):  Paulino Zhu MD     ASSISTANT:  Lisa Cherry MD     ESTIMATED BLOOD LOSS:  Minimal.     COMPLICATIONS:  None     FINDINGS:    1.   Defect of the left cribriform plate   2.   Repair included duragen and left middle turbinate flap.      INDICATIONS FOR PROCEDURE:  This is a 67-year-old male with a history of multiple endoscopic sinus surgeries. He recently underwent endoscopic sinus surgery about 1 week ago. Since his surgery he has been having clear nasal drainage from the left nares. He was evaluated in clinic where the fluid was sampled for B2-transferrin. He was advised to undergo endoscopic closure of his CSF leak. The morning of surgery his B2 came back positive.     The risks, benefits, and alternatives of surgery were discussed at length with the patient and include, but are not limited to bleeding, infection, need for revision surgery, injury to the eye or brain, double vision, loss of vision, cerebrospinal fluid leakage, meningitis, chronic sinusitis, nasal obstruction, nasal septal perforation, numbness to teeth or cheek, or need for time and expense off work.  The patient demonstrates understanding and wishes to proceed.  All of his questions were answered to his satisfaction.     DESCRIPTION OF PROCEDURE:  The patient was taken back to the operating room and after successful induction of general anesthesia, was prepped and draped in a sterile fashion.  The [employer] neuronavigation system was employed.  The images  were uploaded, the instruments were calibrated, and accuracy was confirmed.  Afrin pledgets were used intranasally for decongestion.    A pre-operative timeout was taken to confirm the correct patient and procedure being performed.     The neurosurgical team placed a lumbar drain. Opening pressure was measured to be 15cm H2O. Fluorecin was injected after dilution (0.1cc of 10% in 10cc of CSF fluid) into the intrathecal space. The patient was then placed in Trendelenburg.      Attention was first turned to the left side. The 0 degree endoscope was used to evaluate the left nasal cavity. Post surgical changes were noted. There was dissolvable packing within the left cribriform plate. There was a yellow tinge to the packing. Once removed there was an obvious defect of the left cribriform, about the sie of the head of an olive tip suction. The skull base was stripped of all mucosa, and the residual ethmoid cells was removed to the skull base.     Next, the natural sphenoid ostium was identified and the sphenoid sinus was opened widely using 2 and 3 mm Koros Kerrison rongeurs and communicated into the ethmoid cavity.  Next, a 30-degree endoscope and frontal sinus suction were then used to complete the anterior ethmoidectomy.  The natural frontal sinus outflow tract was identified, cannulated, and the frontal sinus was opened widely in a submucosal fashion.     The skull base defect was closed using duragen inlay. After the dura was closed, there was no CSF identified from around the defect.      The left middle turbinate had post surgical changes from his previous surgeries. There was an attachment superior to the axilla, and posteriorly at the lateral wall. The superior attachment was transected at the skull base. A [place]ar[place] tip bovie was used to splay the mucosa open like a book. The flap was rotated over the defect, and reached without any significant tension.     Adherus was used to secure the flap in place. Nasopore  was used to support the flap against the skull base.      Attention was then turned to the right side. The endoscope was used to assess the skull base. There was no obvious CSF identified from the skull base on the right.      At this point the procedure was complete. At the conclusion of the case, the nose was copiously irrigated with saline solution to remove all debris and both inferior turbinates were outfractured.  Hemostasis was obtained throughout using suction monopolar cautery as well as dilute epinephrine/saline pledgets until hemostasis was achieved.  A Propel Contour stent was placed in the left frontal outflow tracts to maintain patency.    The patient was then awakened, extubated, and taken to the neuro ICU in stable condition.      I was scrubbed and personally present during all portions of this procedure, and I was immediately available throughout the entire procedure.    Paulino Zhu MD

## 2022-12-17 NOTE — SUBJECTIVE & OBJECTIVE
Interval History: NAEON. No drainage from nose.     Medications:  Continuous Infusions:  Scheduled Meds:   atorvastatin  80 mg Oral Daily    fluorescein  5 mL Intravenous Once    gabapentin  300 mg Oral TID    levothyroxine  50 mcg Oral Before breakfast    LIDOcaine (PF) 10 mg/ml (1%)  1 mL Intradermal Once    mupirocin   Nasal BID    polyethylene glycol  17 g Oral Daily    tamsulosin  0.4 mg Oral Daily     PRN Meds:acetaminophen, ceFAZolin (ANCEF) IVPB, labetalol, oxyCODONE, QUEtiapine, sodium chloride 0.9%, sodium chloride 0.9%     Review of patient's allergies indicates:  No Known Allergies  Objective:     Vital Signs (24h Range):  Temp:  [98.2 °F (36.8 °C)-98.9 °F (37.2 °C)] 98.4 °F (36.9 °C)  Pulse:  [] 85  Resp:  [17-31] 20  SpO2:  [93 %-97 %] 94 %  BP: (124-173)/(71-95) 141/78     Date 12/17/22 0700 - 12/18/22 0659   Shift 4579-5655 3046-1178 1885-9498 24 Hour Total   INTAKE   Shift Total(mL/kg)       OUTPUT   Drains 10   10   Shift Total(mL/kg) 10(0.1)   10(0.1)   Weight (kg) 89 89 89 89     Lines/Drains/Airways       Drain  Duration                  Lumbar Drain 12/16/22 2000 <1 day    Male External Urinary Catheter 12/16/22 2030 Medium <1 day              Peripheral Intravenous Line  Duration                  Peripheral IV - Single Lumen 12/16/22 1402 18 G Posterior;Right Forearm <1 day         Peripheral IV - Single Lumen 12/16/22 2300 22 G Anterior;Right Forearm <1 day                    Physical Exam  Constitutional:       Appearance: Normal appearance.   HENT:      Nose:      Comments: No drainage      Mouth/Throat:      Pharynx: Oropharynx is clear.   Eyes:      Extraocular Movements: Extraocular movements intact.   Neurological:      Mental Status: He is alert.       Significant Labs:  CBC:   Recent Labs   Lab 12/16/22 2037   WBC 10.32   RBC 4.76   HGB 15.0   HCT 45.1      MCV 95   MCH 31.5*   MCHC 33.3     CMP:   Recent Labs   Lab 12/16/22 2037      CALCIUM 8.4*   ALBUMIN 3.0*    PROT 6.2      K 4.6   CO2 24      BUN 16   CREATININE 0.8   ALKPHOS 86   ALT 66*   AST 31   BILITOT 1.0       Significant Diagnostics:  CT: I have reviewed all pertinent results/findings within the past 24 hours and my personal findings are:  Appropriate postoperative appearance, flap in place over L defect

## 2022-12-17 NOTE — ASSESSMENT & PLAN NOTE
68 y/o m w/ pmhx of coronary artery atheroma, HTN, hypothyroidism, urinary retention, frequent sinus infections presents post op CSF leak repair. CSF leak after sinus surgery approximately 1 week prior to admission.   - Admit to [facility] for close monitoring in post op setting  - q1h neuro checks, vitals, I/Os   - NSGY and ENT following   - CTH (12/16) with expected post op changes   - Lumbar drain in place, draining 5cc/hr per ENT and NSGY   - SBP goals <160  - PRN labetalol, hydralazine  - HOB restriction to <30 degrees  -No post op imaging per ENT  -PT/INR, aPTT pending  -CBC, CMP, Mag, Phos pending, then daily   -SLP  -PT/OT once HOB restrictions no longer required

## 2022-12-17 NOTE — ASSESSMENT & PLAN NOTE
67M with LD placed on 12/16/22 for ethmoidal CSF fistual repair with ENT    Plan:  --icu   --q1 nc/vs icu, q2 stepdown, q4 floor  --all labs and significant diagnostics reviewed  --LD @ 5cc/hr x3 days (Day 1/3)  --please notfiy nsgy of any acute neurological decline    Dispo: icu while LD in place; TTF to ENT after LD removed

## 2022-12-17 NOTE — PLAN OF CARE
[facility] Care Plan    POC reviewed with Sarabjit Velasquez at 0300. Pt verbalized understanding. Questions and concerns addressed. No acute events overnight. Pt progressing toward goals. Will continue to monitor. See below and flowsheets for full assessment and VS info.     -Passed Powers.  -Lumbar drain to remove 5cc/hr. Drain required placement <10 to reach goal drainage.  -PRN tylenol and heat packs used to address pain.      Is this a stroke patient? no    Neuro:  Waldron Coma Scale  Best Eye Response: 4-->(E4) spontaneous  Best Motor Response: 6-->(M6) obeys commands  Best Verbal Response: 5-->(V5) oriented  Waldron Coma Scale Score: 15  Assessment Qualifiers: no eye obstruction present  Pupil PERRLA: yes     24hr Temp:  [98.2 °F (36.8 °C)-98.9 °F (37.2 °C)]     CV:   Rhythm: normal sinus rhythm  BP goals:   SBP < 160  MAP > 65    Resp:           Plan:  wean to room air    GI/:     Diet/Nutrition Received: NPO  Last Bowel Movement: 12/15/22  Voiding Characteristics: external catheter    Intake/Output Summary (Last 24 hours) at 12/17/2022 0502  Last data filed at 12/17/2022 0405  Gross per 24 hour   Intake 1100 ml   Output 1245 ml   Net -145 ml          Labs/Accuchecks:  Recent Labs   Lab 12/16/22 2037   WBC 10.32   RBC 4.76   HGB 15.0   HCT 45.1         Recent Labs   Lab 12/16/22 2037      K 4.6   CO2 24      BUN 16   CREATININE 0.8   ALKPHOS 86   ALT 66*   AST 31   BILITOT 1.0      Recent Labs   Lab 12/16/22 2037   INR 1.1   APTT 24.3    No results for input(s): CPK, CPKMB, TROPONINI, MB in the last 168 hours.    Electrolytes: N/A - electrolytes WDL  Accuchecks: none    Gtts:      LDA/Wounds:  Lines/Drains/Airways       Drain  Duration                  Lumbar Drain 12/16/22 2000 <1 day    Male External Urinary Catheter 12/16/22 2030 Medium <1 day              Peripheral Intravenous Line  Duration                  Peripheral IV - Single Lumen 12/16/22 1402 18 G Posterior;Right Forearm <1  day         Peripheral IV - Single Lumen 12/16/22 2300 22 G Anterior;Right Forearm <1 day                  Wounds: No  Wound care consulted: No

## 2022-12-17 NOTE — SUBJECTIVE & OBJECTIVE
Interval History:  See hospital course above.     Review of Systems   Constitutional:  Negative for chills, fatigue and fever.   HENT:  Positive for congestion (mild). Negative for facial swelling, nosebleeds, postnasal drip, rhinorrhea, sore throat and trouble swallowing.    Eyes:  Negative for photophobia and visual disturbance.   Respiratory:  Negative for shortness of breath.    Cardiovascular:  Negative for chest pain.   Gastrointestinal:  Negative for abdominal pain, nausea and vomiting.   Genitourinary:  Positive for difficulty urinating (history of retention). Negative for dysuria.   Musculoskeletal:  Negative for neck pain and neck stiffness.   Neurological:  Positive for headaches. Negative for facial asymmetry, speech difficulty and weakness.   Psychiatric/Behavioral:  Negative for agitation and confusion.      Objective:     Vitals:  Temp: 98.4 °F (36.9 °C)  Pulse: 85  Rhythm: normal sinus rhythm  BP: (!) 141/78  MAP (mmHg): 97  Resp: 20  SpO2: (!) 94 %    Temp  Min: 98.2 °F (36.8 °C)  Max: 98.9 °F (37.2 °C)  Pulse  Min: 70  Max: 104  BP  Min: 124/71  Max: 173/95  MAP (mmHg)  Min: 92  Max: 110  Resp  Min: 17  Max: 31  SpO2  Min: 93 %  Max: 97 %    12/16 0701 - 12/17 0700  In: 1100 [P.O.:100]  Out: 1255 [Urine:1200; Drains:55]           Physical Exam  Vitals and nursing note reviewed.   Constitutional:       General: He is not in acute distress.     Appearance: Normal appearance.      Comments: Well developed. Well nourished. Resting comfortably in bed.   HENT:      Head: Normocephalic and atraumatic.      Right Ear: External ear normal.      Left Ear: External ear normal.      Nose: Nose normal.      Mouth/Throat:      Mouth: Mucous membranes are moist.      Pharynx: Oropharynx is clear.   Eyes:      General: No scleral icterus.     Extraocular Movements: Extraocular movements intact.      Pupils: Pupils are equal, round, and reactive to light.   Cardiovascular:      Rate and Rhythm: Normal rate and  regular rhythm.   Pulmonary:      Effort: Pulmonary effort is normal. No respiratory distress.   Abdominal:      General: Abdomen is flat. There is no distension.   Musculoskeletal:      Right lower leg: No edema.      Left lower leg: No edema.   Skin:     General: Skin is warm and dry.   Neurological:      Mental Status: He is alert.      Comments: E4V5M6  Awake, alert, & oriented x 4. Speech fluent. Follows commands briskly.   PERRL. EOMI. No facial asymmetry. Conversational hearing intact. Swallows without difficulty. Voice is not hoarse. Shoulder shrug intact. Tongue protrudes midline with no deviations or fasciculations.   Moves all extremities spontaneously, symmetrically, and against gravity. Strength 5/5 & SILT in all 4 extremities. No pronator drift. Tone normal.      Gait & coordination exams deferred.     Medications:  Continuous Scheduledatorvastatin, 80 mg, Daily  fluorescein, 5 mL, Once  gabapentin, 300 mg, TID  levothyroxine, 50 mcg, Before breakfast  LIDOcaine (PF) 10 mg/ml (1%), 1 mL, Once  mupirocin, , BID  polyethylene glycol, 17 g, Daily  tamsulosin, 0.4 mg, Daily    PRNacetaminophen, 650 mg, Q6H PRN  ceFAZolin (ANCEF) IVPB, 2 g, On Call Procedure  labetalol, 10 mg, Q15 Min PRN  oxyCODONE, 5 mg, Q6H PRN  QUEtiapine, 12.5 mg, Nightly PRN  sodium chloride 0.9%, 10 mL, PRN  sodium chloride 0.9%, 10 mL, PRN      Today I personally reviewed pertinent medications, lines/drains/airways, imaging, cardiology results, laboratory results, microbiology results, notably:    Laboratory:  CBC:  Recent Labs   Lab 12/16/22 2037   WBC 10.32   RBC 4.76   HGB 15.0   HCT 45.1      MCV 95   MCH 31.5*   MCHC 33.3       CMP:  Recent Labs   Lab 12/16/22 2037   CALCIUM 8.4*   PROT 6.2      K 4.6   CO2 24      BUN 16   CREATININE 0.8   ALKPHOS 86   ALT 66*   AST 31   BILITOT 1.0       Coagulation:  Recent Labs   Lab 12/16/22 2037   LABPROT 11.8   INR 1.1   APTT 24.3       Lipid Panel:  Recent Labs    Lab 12/16/22 2037   CHOL 158   HDL 28*   LDLCALC 103.6   TRIG 132       Endocrine:  Recent Labs     12/16/22 2037   HGBA1C 5.3   TSH 0.330*       Imaging:   CT Head Without Contrast  FINDINGS:  There is no evidence of hydrocephalus mass effect intracranial hemorrhage or acute territorial infarct.  The brain parenchyma maintains normal attenuation.  There is a similar appearance of the paranasal sinuses with diffuse fluid/mucosal thickening most notable involving the ethmoid and sphenoid sinuses.  Nasal packing material on the left.  Osseous defects involving the left greater than right cribriform plates and left lateral lamella are noted.  Impression:  No acute intracranial process.  Osseous defects left greater than right cribriform plates and left lateral lamella.  Electronically signed by: Pilo Mock  Date:                                            12/17/2022  Time:                                           08:47      Diet  Diet Adult Regular (IDDSI Level 7)  Diet Adult Regular (IDDSI Level 7)

## 2022-12-17 NOTE — PROGRESS NOTES
Patient arrived to [facility] from [facility] [place]    Report received from: CRNA    Type of stroke/diagnosis:  CSF leak    Current symptoms: AAO x4    Skin assessment done: Y  Wounds noted: none    Powers Completed? yes    Patient Belongings on Admit: none    NCC notified: name of person notified Soumya LARES

## 2022-12-17 NOTE — SUBJECTIVE & OBJECTIVE
Past Medical History:   Diagnosis Date    Essential (primary) hypertension     Other hyperlipidemia      History reviewed. No pertinent surgical history.   No current facility-administered medications on file prior to encounter.     Current Outpatient Medications on File Prior to Encounter   Medication Sig Dispense Refill    cefUROXime (CEFTIN) 500 MG tablet Take 500 mg by mouth 2 (two) times daily.      ezetimibe (ZETIA) 10 mg tablet Take 5 mg by mouth every evening.      finasteride (PROSCAR) 5 mg tablet Take 5 mg by mouth every morning.      gabapentin (NEURONTIN) 300 MG capsule Take 300 mg by mouth 3 (three) times daily as needed.      levothyroxine (SYNTHROID) 50 MCG tablet Take 50 mcg by mouth before breakfast.      QUEtiapine (SEROQUEL) 25 MG Tab Take 18.25 mg by mouth every evening.      rosuvastatin (CRESTOR) 20 MG tablet Take 20 mg by mouth every evening.      tamsulosin (FLOMAX) 0.4 mg Cap Take 0.4 mg by mouth every morning.      telmisartan (MICARDIS) 20 MG Tab Take by mouth every evening.      tiZANidine 4 mg Cap Take 4 mg by mouth daily as needed.      zolpidem (AMBIEN CR) 12.5 MG CR tablet Take 12.5 mg by mouth nightly as needed for Insomnia.      aspirin (ECOTRIN) 81 MG EC tablet Take 81 mg by mouth.      meloxicam (MOBIC) 15 MG tablet Take 15 mg by mouth.      tadalafiL (CIALIS) 5 MG tablet Take 5 mg by mouth once daily.        Allergies: Patient has no known allergies.    History reviewed. No pertinent family history.  Social History     Tobacco Use    Smoking status: Never    Smokeless tobacco: Never     Review of Systems   Constitutional:  Negative for chills and fever.   HENT:  Positive for congestion (mild). Negative for drooling, facial swelling, nosebleeds, postnasal drip, rhinorrhea, sinus pressure, sinus pain, sore throat, trouble swallowing and voice change.    Eyes:  Negative for photophobia and visual disturbance.   Respiratory:  Negative for cough and shortness of breath.     Cardiovascular:  Negative for chest pain and palpitations.   Gastrointestinal:  Negative for abdominal pain, nausea and vomiting.   Genitourinary:  Positive for difficulty urinating (history of retention). Negative for dysuria.   Musculoskeletal:  Negative for neck pain and neck stiffness.   Neurological:  Negative for facial asymmetry and weakness.   Psychiatric/Behavioral:  Negative for agitation and confusion.    Objective:     Vitals:    Temp: 98.3 °F (36.8 °C)  Pulse: 104  BP: (!) 173/95  Resp: 18  SpO2: 96 %    Temp  Min: 98.3 °F (36.8 °C)  Max: 98.3 °F (36.8 °C)  Pulse  Min: 104  Max: 104  BP  Min: 173/95  Max: 173/95  Resp  Min: 18  Max: 18  SpO2  Min: 96 %  Max: 96 %    No intake/output data recorded.           Physical Exam    Constitutional: Well-nourished, well-developed.   No obvious distress. Face mask in place.   Patient lying in bed comfortably with HOB at 30 degrees.     Eyes: Clear conjunctiva. Anicteric. No discharge.   Lids without lesions.    HEENT: Normocephalic, atraumatic. Minimal dried blood around nare.   Nose and external ears atraumatic.   Mucus membranes are moist.     Cardio: Regular rate and rhythm on telemetry monitor.    Respiratory: Regular effort, no respiratory distress. Face mask on, O2 sats of 96% on monitor.     GI: Soft, non-distended, non-tender.    Skin: No erythema, rash, or wounds to exposed skin.     Neuro:E4 V5 M6    Awake, alert, and oriented to self, time, place, and situation. Patient is answering all questions appropriately and following all commands briskly.   No facial droop. EOMI. PERRL.    Motor:   MARCUM spontaneously and against gravity   RUE: 5/5  LUE: 5/5  RLE: 5/5  LLE: 5/5    Sensory:  Sensation grossly intact      Today I personally reviewed pertinent medications, lines/drains/airways, laboratory results, notably: Lumbar drain, exam

## 2022-12-17 NOTE — PROGRESS NOTES
[facility]  Neurocritical Care  Progress Note    Admit Date: 12/16/2022  Service Date: 12/17/2022  Length of Stay: 1    Subjective:     Chief Complaint: Postoperative CSF leak    History of Present Illness: Mr. Velasquez is a 68 y/o male w/ pmhx of coronary artery atheroma, HTN, hypothyroidism, urinary retention, frequent sinus infections and recent sinus surgery approximately 1 week who present post op from ENT/NeuroSurgery operation to intranasally repair residual CSF leak. Lumbar drain in place to drain at 5cc/hr. Admit to [facility] for close neurologic monitoring in post op period and while lumbar drain in place.       Hospital Course: 12/17/2022: NAEON. POD1 s/p ethmoid CSF fistula repair. CTH with expected post-op changes. LD draining 5cc/hr.       Interval History:  See hospital course above.     Review of Systems   Constitutional:  Negative for chills, fatigue and fever.   HENT:  Positive for congestion (mild). Negative for facial swelling, nosebleeds, postnasal drip, rhinorrhea, sore throat and trouble swallowing.    Eyes:  Negative for photophobia and visual disturbance.   Respiratory:  Negative for shortness of breath.    Cardiovascular:  Negative for chest pain.   Gastrointestinal:  Negative for abdominal pain, nausea and vomiting.   Genitourinary:  Positive for difficulty urinating (history of retention). Negative for dysuria.   Musculoskeletal:  Negative for neck pain and neck stiffness.   Neurological:  Positive for headaches. Negative for facial asymmetry, speech difficulty and weakness.   Psychiatric/Behavioral:  Negative for agitation and confusion.      Objective:     Vitals:  Temp: 98.4 °F (36.9 °C)  Pulse: 85  Rhythm: normal sinus rhythm  BP: (!) 141/78  MAP (mmHg): 97  Resp: 20  SpO2: (!) 94 %    Temp  Min: 98.2 °F (36.8 °C)  Max: 98.9 °F (37.2 °C)  Pulse  Min: 70  Max: 104  BP  Min: 124/71  Max: 173/95  MAP (mmHg)  Min: 92  Max: 110  Resp  Min: 17  Max: 31  SpO2  Min: 93 %  Max: 97  %    12/16 0701 - 12/17 0700  In: 1100 [P.O.:100]  Out: 1255 [Urine:1200; Drains:55]           Physical Exam  Vitals and nursing note reviewed.   Constitutional:       General: He is not in acute distress.     Appearance: Normal appearance.      Comments: Well developed. Well nourished. Resting comfortably in bed.   HENT:      Head: Normocephalic and atraumatic.      Right Ear: External ear normal.      Left Ear: External ear normal.      Nose: Nose normal.      Mouth/Throat:      Mouth: Mucous membranes are moist.      Pharynx: Oropharynx is clear.   Eyes:      General: No scleral icterus.     Extraocular Movements: Extraocular movements intact.      Pupils: Pupils are equal, round, and reactive to light.   Cardiovascular:      Rate and Rhythm: Normal rate and regular rhythm.   Pulmonary:      Effort: Pulmonary effort is normal. No respiratory distress.   Abdominal:      General: Abdomen is flat. There is no distension.   Musculoskeletal:      Right lower leg: No edema.      Left lower leg: No edema.   Skin:     General: Skin is warm and dry.   Neurological:      Mental Status: He is alert.      Comments: E4V5M6  Awake, alert, & oriented x 4. Speech fluent. Follows commands briskly.   PERRL. EOMI. No facial asymmetry. Conversational hearing intact. Swallows without difficulty. Voice is not hoarse. Shoulder shrug intact. Tongue protrudes midline with no deviations or fasciculations.   Moves all extremities spontaneously, symmetrically, and against gravity. Strength 5/5 & SILT in all 4 extremities. No pronator drift. Tone normal.      Gait & coordination exams deferred.     Medications:  Continuous Scheduledatorvastatin, 80 mg, Daily  fluorescein, 5 mL, Once  gabapentin, 300 mg, TID  levothyroxine, 50 mcg, Before breakfast  LIDOcaine (PF) 10 mg/ml (1%), 1 mL, Once  mupirocin, , BID  polyethylene glycol, 17 g, Daily  tamsulosin, 0.4 mg, Daily    PRNacetaminophen, 650 mg, Q6H PRN  ceFAZolin (ANCEF) IVPB, 2 g, On Call  Procedure  labetalol, 10 mg, Q15 Min PRN  oxyCODONE, 5 mg, Q6H PRN  QUEtiapine, 12.5 mg, Nightly PRN  sodium chloride 0.9%, 10 mL, PRN  sodium chloride 0.9%, 10 mL, PRN      Today I personally reviewed pertinent medications, lines/drains/airways, imaging, cardiology results, laboratory results, microbiology results, notably:    Laboratory:  CBC:  Recent Labs   Lab 12/16/22 2037   WBC 10.32   RBC 4.76   HGB 15.0   HCT 45.1      MCV 95   MCH 31.5*   MCHC 33.3       CMP:  Recent Labs   Lab 12/16/22 2037   CALCIUM 8.4*   PROT 6.2      K 4.6   CO2 24      BUN 16   CREATININE 0.8   ALKPHOS 86   ALT 66*   AST 31   BILITOT 1.0       Coagulation:  Recent Labs   Lab 12/16/22 2037   LABPROT 11.8   INR 1.1   APTT 24.3       Lipid Panel:  Recent Labs   Lab 12/16/22 2037   CHOL 158   HDL 28*   LDLCALC 103.6   TRIG 132       Endocrine:  Recent Labs     12/16/22 2037   HGBA1C 5.3   TSH 0.330*       Imaging:   CT Head Without Contrast  FINDINGS:  There is no evidence of hydrocephalus mass effect intracranial hemorrhage or acute territorial infarct.  The brain parenchyma maintains normal attenuation.  There is a similar appearance of the paranasal sinuses with diffuse fluid/mucosal thickening most notable involving the ethmoid and sphenoid sinuses.  Nasal packing material on the left.  Osseous defects involving the left greater than right cribriform plates and left lateral lamella are noted.  Impression:  No acute intracranial process.  Osseous defects left greater than right cribriform plates and left lateral lamella.  Electronically signed by: Pilo Mock  Date:                                            12/17/2022  Time:                                           08:47      Diet  Diet Adult Regular (IDDSI Level 7)  Diet Adult Regular (IDDSI Level 7)        Assessment/Plan:     Neuro  * Postoperative CSF leak  68 y/o m w/ pmhx of coronary artery atheroma, HTN, hypothyroidism, urinary retention, frequent  sinus infections presents post op CSF leak repair. CSF leak after sinus surgery approximately 1 week prior to admission.   - Admit to [facility] for close monitoring in post op setting  - q1h neuro checks, vitals, I/Os   - NSGY and ENT following   - CTH (12/16) with expected post op changes   - Lumbar drain in place, draining 5cc/hr per ENT and NSGY   - SBP goals <160  - PRN labetalol, hydralazine  - HOB restriction to <30 degrees  -No post op imaging per ENT  -PT/INR, aPTT pending  -CBC, CMP, Mag, Phos pending, then daily   -SLP  -PT/OT once HOB restrictions no longer required     Cardiac/Vascular  Primary hypertension  History of  - EKG, echo  - SBP goal <160   - PRN labetalol, hydralazine      Coronary artery atheroma  History of,   -Holding daily ASA in initial post op period   -EKG and Echo pending     Renal/  Urinary retention  History of   - Bladder scan q6h, PRN straight cath   - Continue home flomax  - Reports history of incomplete bladder emptying    Endocrine  Other specified hypothyroidism  History of,   - TSH, FT4 sent  - Continue home synthroid         The patient is being Prophylaxed for:  Venous Thromboembolism with: Mechanical  Stress Ulcer with: Not Applicable   Ventilator Pneumonia with: not applicable    Activity Orders            Diet Adult Regular (IDDSI Level 7): Regular starting at 12/17 0848    Turn patient starting at 12/16 2200    Elevate HOB starting at 12/16 2017          Full Code    Critical condition in that Patient has a condition that poses threat to life and bodily function: CSF leak s/p ethmoid CSF fistula repair, lumbar drain in place, HTN, hypothyroidism,      35 minutes of critical care time was spent personally by me on the following activities: development of treatment plan with patient or surrogate and bedside caregivers, discussions with consultants, evaluation of patient's response to treatment, examination of patient, ordering and performing treatments and interventions,  ordering and review of laboratory studies, ordering and review of radiographic studies, pulse oximetry, antibiotic titration if applicable, re-evaluation of patient's condition. This critical care time did not overlap with that of any other provider or involve time for any procedures. There is high probability for acute neurological change leading to clinical and possibly life-threatening deterioration requiring highest level of physician preparedness for urgent intervention.    Lala Mukherjee PA-C  Neurocritical Care  [facility] - Neuro Critical Care

## 2022-12-17 NOTE — PROGRESS NOTES
[facility] - Neuro Critical Care  Otorhinolaryngology-Head & Neck Surgery  Progress Note    Subjective:     Post-Op Info:  Procedure(s) (LRB):  REPAIR, ETHMOID SINUS, ENDOSCOPIC, FOR CSF LEAK (Left)  FESS, USING COMPUTER-ASSISTED NAVIGATION (Bilateral)  LUMBAR PUNCTURE (N/A)   1 Day Post-Op  Hospital Day: 2     Interval History: NAEON. No drainage from nose.     Medications:  Continuous Infusions:  Scheduled Meds:   atorvastatin  80 mg Oral Daily    fluorescein  5 mL Intravenous Once    gabapentin  300 mg Oral TID    levothyroxine  50 mcg Oral Before breakfast    LIDOcaine (PF) 10 mg/ml (1%)  1 mL Intradermal Once    mupirocin   Nasal BID    polyethylene glycol  17 g Oral Daily    tamsulosin  0.4 mg Oral Daily     PRN Meds:acetaminophen, ceFAZolin (ANCEF) IVPB, labetalol, oxyCODONE, QUEtiapine, sodium chloride 0.9%, sodium chloride 0.9%     Review of patient's allergies indicates:  No Known Allergies  Objective:     Vital Signs (24h Range):  Temp:  [98.2 °F (36.8 °C)-98.9 °F (37.2 °C)] 98.4 °F (36.9 °C)  Pulse:  [] 85  Resp:  [17-31] 20  SpO2:  [93 %-97 %] 94 %  BP: (124-173)/(71-95) 141/78     Date 12/17/22 0700 - 12/18/22 0659   Shift 8765-6125 2528-5870 6157-7664 24 Hour Total   INTAKE   Shift Total(mL/kg)       OUTPUT   Drains 10   10   Shift Total(mL/kg) 10(0.1)   10(0.1)   Weight (kg) 89 89 89 89     Lines/Drains/Airways       Drain  Duration                  Lumbar Drain 12/16/22 2000 <1 day    Male External Urinary Catheter 12/16/22 2030 Medium <1 day              Peripheral Intravenous Line  Duration                  Peripheral IV - Single Lumen 12/16/22 1402 18 G Posterior;Right Forearm <1 day         Peripheral IV - Single Lumen 12/16/22 2300 22 G Anterior;Right Forearm <1 day                    Physical Exam  Constitutional:       Appearance: Normal appearance.   HENT:      Nose:      Comments: No drainage      Mouth/Throat:      Pharynx: Oropharynx is clear.   Eyes:      Extraocular  Movements: Extraocular movements intact.   Neurological:      Mental Status: He is alert.       Significant Labs:  CBC:   Recent Labs   Lab 12/16/22 2037   WBC 10.32   RBC 4.76   HGB 15.0   HCT 45.1      MCV 95   MCH 31.5*   MCHC 33.3     CMP:   Recent Labs   Lab 12/16/22 2037      CALCIUM 8.4*   ALBUMIN 3.0*   PROT 6.2      K 4.6   CO2 24      BUN 16   CREATININE 0.8   ALKPHOS 86   ALT 66*   AST 31   BILITOT 1.0       Significant Diagnostics:  CT: I have reviewed all pertinent results/findings within the past 24 hours and my personal findings are:  Appropriate postoperative appearance, flap in place over L defect     Assessment/Plan:     * Postoperative CSF leak  67 y.o M with prior FESS and development of CSF leak, s/p lumbar drain, defect noted to be at L cribriform plate, s/p  middle turbinate flap on 12/16. Postop CTH appropriate.     - CSF leak precautions   - Sinonasal precautions; no nose blowing, sneeze with mouth open   - No straining, stool softeners   - HOB at 30 degrees   - Keep LD in place at 5cc/hr           Lisa Cherry MD  Otorhinolaryngology-Head & Neck Surgery  [facility] - Neuro Critical [facility]

## 2022-12-17 NOTE — TRANSFER OF CARE
"Anesthesia Transfer of Care Note    Patient: Sarabjit Velasquez    Procedure(s) Performed: Procedure(s) (LRB):  REPAIR, ETHMOID SINUS, ENDOSCOPIC, FOR CSF LEAK (Left)  FESS, USING COMPUTER-ASSISTED NAVIGATION (Bilateral)  LUMBAR PUNCTURE (N/A)    Patient location: PACU    Anesthesia Type: general    Transport from OR: Transported from OR on 6-10 L/min O2 by face mask with adequate spontaneous ventilation    Post pain: adequate analgesia    Post assessment: no apparent anesthetic complications    Post vital signs: stable    Level of consciousness: awake, alert and oriented    Nausea/Vomiting: no nausea/vomiting    Complications: none    Transfer of care protocol was followed      Last vitals:   Visit Vitals  BP (!) 173/95   Pulse 104   Temp 36.8 °C (98.3 °F) (Oral)   Resp 18   Ht 5' 9" (1.753 m)   Wt 89 kg (196 lb 3.4 oz)   SpO2 96%   BMI 28.98 kg/m²     "

## 2022-12-18 LAB
ALBUMIN SERPL BCP-MCNC: 3 G/DL (ref 3.5–5.2)
ALP SERPL-CCNC: 79 U/L (ref 55–135)
ALT SERPL W/O P-5'-P-CCNC: 54 U/L (ref 10–44)
ANION GAP SERPL CALC-SCNC: 12 MMOL/L (ref 8–16)
ASCENDING AORTA: 3.63 CM
AST SERPL-CCNC: 26 U/L (ref 10–40)
AV INDEX (PROSTH): 0.87
AV MEAN GRADIENT: 7 MMHG
AV PEAK GRADIENT: 14 MMHG
AV VALVE AREA: 3.97 CM2
AV VELOCITY RATIO: 0.75
BASOPHILS # BLD AUTO: 0.02 K/UL (ref 0–0.2)
BASOPHILS NFR BLD: 0.2 % (ref 0–1.9)
BILIRUB SERPL-MCNC: 0.8 MG/DL (ref 0.1–1)
BSA FOR ECHO PROCEDURE: 2.08 M2
BUN SERPL-MCNC: 11 MG/DL (ref 8–23)
CALCIUM SERPL-MCNC: 8.4 MG/DL (ref 8.7–10.5)
CHLORIDE SERPL-SCNC: 106 MMOL/L (ref 95–110)
CO2 SERPL-SCNC: 21 MMOL/L (ref 23–29)
CREAT SERPL-MCNC: 0.8 MG/DL (ref 0.5–1.4)
CV ECHO LV RWT: 0.43 CM
DIFFERENTIAL METHOD: ABNORMAL
DOP CALC AO PEAK VEL: 1.86 M/S
DOP CALC AO VTI: 34.68 CM
DOP CALC LVOT AREA: 4.6 CM2
DOP CALC LVOT DIAMETER: 2.41 CM
DOP CALC LVOT PEAK VEL: 1.39 M/S
DOP CALC LVOT STROKE VOLUME: 137.51 CM3
DOP CALCLVOT PEAK VEL VTI: 30.16 CM
E WAVE DECELERATION TIME: 131.06 MSEC
E/A RATIO: 0.77
E/E' RATIO: 9.1 M/S
ECHO LV POSTERIOR WALL: 1.11 CM (ref 0.6–1.1)
EJECTION FRACTION: 55 %
EOSINOPHIL # BLD AUTO: 0.1 K/UL (ref 0–0.5)
EOSINOPHIL NFR BLD: 1.5 % (ref 0–8)
ERYTHROCYTE [DISTWIDTH] IN BLOOD BY AUTOMATED COUNT: 14.7 % (ref 11.5–14.5)
EST. GFR  (NO RACE VARIABLE): >60 ML/MIN/1.73 M^2
FRACTIONAL SHORTENING: 37 % (ref 28–44)
GLUCOSE SERPL-MCNC: 90 MG/DL (ref 70–110)
HCT VFR BLD AUTO: 47.2 % (ref 40–54)
HGB BLD-MCNC: 15.9 G/DL (ref 14–18)
IMM GRANULOCYTES # BLD AUTO: 0.03 K/UL (ref 0–0.04)
IMM GRANULOCYTES NFR BLD AUTO: 0.4 % (ref 0–0.5)
INTERVENTRICULAR SEPTUM: 1.24 CM (ref 0.6–1.1)
IVRT: 94.2 MSEC
LA MAJOR: 6.76 CM
LA MINOR: 6.28 CM
LA WIDTH: 4.31 CM
LEFT ATRIUM SIZE: 3.56 CM
LEFT ATRIUM VOLUME INDEX MOD: 33.8 ML/M2
LEFT ATRIUM VOLUME INDEX: 41.4 ML/M2
LEFT ATRIUM VOLUME MOD: 69.3 CM3
LEFT ATRIUM VOLUME: 84.92 CM3
LEFT INTERNAL DIMENSION IN SYSTOLE: 3.29 CM (ref 2.1–4)
LEFT VENTRICLE DIASTOLIC VOLUME INDEX: 63.56 ML/M2
LEFT VENTRICLE DIASTOLIC VOLUME: 130.29 ML
LEFT VENTRICLE MASS INDEX: 118 G/M2
LEFT VENTRICLE SYSTOLIC VOLUME INDEX: 21.4 ML/M2
LEFT VENTRICLE SYSTOLIC VOLUME: 43.86 ML
LEFT VENTRICULAR INTERNAL DIMENSION IN DIASTOLE: 5.21 CM (ref 3.5–6)
LEFT VENTRICULAR MASS: 242.43 G
LV LATERAL E/E' RATIO: 10.11 M/S
LV SEPTAL E/E' RATIO: 8.27 M/S
LYMPHOCYTES # BLD AUTO: 1.3 K/UL (ref 1–4.8)
LYMPHOCYTES NFR BLD: 15.3 % (ref 18–48)
MAGNESIUM SERPL-MCNC: 2.2 MG/DL (ref 1.6–2.6)
MCH RBC QN AUTO: 31.3 PG (ref 27–31)
MCHC RBC AUTO-ENTMCNC: 33.7 G/DL (ref 32–36)
MCV RBC AUTO: 93 FL (ref 82–98)
MONOCYTES # BLD AUTO: 0.7 K/UL (ref 0.3–1)
MONOCYTES NFR BLD: 7.7 % (ref 4–15)
MV PEAK A VEL: 1.18 M/S
MV PEAK E VEL: 0.91 M/S
MV STENOSIS PRESSURE HALF TIME: 38.01 MS
MV VALVE AREA P 1/2 METHOD: 5.79 CM2
NEUTROPHILS # BLD AUTO: 6.4 K/UL (ref 1.8–7.7)
NEUTROPHILS NFR BLD: 74.9 % (ref 38–73)
NRBC BLD-RTO: 0 /100 WBC
PHOSPHATE SERPL-MCNC: 2.7 MG/DL (ref 2.7–4.5)
PISA TR MAX VEL: 1.94 M/S
PLATELET # BLD AUTO: 179 K/UL (ref 150–450)
PMV BLD AUTO: 9.5 FL (ref 9.2–12.9)
POTASSIUM SERPL-SCNC: 4 MMOL/L (ref 3.5–5.1)
PROT SERPL-MCNC: 6.2 G/DL (ref 6–8.4)
QEF: 52 %
RA MAJOR: 5.5 CM
RA WIDTH: 3.7 CM
RBC # BLD AUTO: 5.08 M/UL (ref 4.6–6.2)
RIGHT VENTRICULAR END-DIASTOLIC DIMENSION: 3.34 CM
RV TISSUE DOPPLER FREE WALL SYSTOLIC VELOCITY 1 (APICAL 4 CHAMBER VIEW): 19.53 CM/S
SINUS: 3.53 CM
SODIUM SERPL-SCNC: 139 MMOL/L (ref 136–145)
STJ: 2.98 CM
TDI LATERAL: 0.09 M/S
TDI SEPTAL: 0.11 M/S
TDI: 0.1 M/S
TR MAX PG: 15 MMHG
TRICUSPID ANNULAR PLANE SYSTOLIC EXCURSION: 2.22 CM
WBC # BLD AUTO: 8.56 K/UL (ref 3.9–12.7)

## 2022-12-18 PROCEDURE — 83735 ASSAY OF MAGNESIUM: CPT

## 2022-12-18 PROCEDURE — 97535 SELF CARE MNGMENT TRAINING: CPT

## 2022-12-18 PROCEDURE — 80053 COMPREHEN METABOLIC PANEL: CPT

## 2022-12-18 PROCEDURE — 84100 ASSAY OF PHOSPHORUS: CPT

## 2022-12-18 PROCEDURE — 25000003 PHARM REV CODE 250

## 2022-12-18 PROCEDURE — 20000000 HC ICU ROOM

## 2022-12-18 PROCEDURE — 63600175 PHARM REV CODE 636 W HCPCS

## 2022-12-18 PROCEDURE — 85025 COMPLETE CBC W/AUTO DIFF WBC: CPT

## 2022-12-18 PROCEDURE — 99233 SBSQ HOSP IP/OBS HIGH 50: CPT | Mod: ,,, | Performed by: PSYCHIATRY & NEUROLOGY

## 2022-12-18 PROCEDURE — 25000003 PHARM REV CODE 250: Performed by: PSYCHIATRY & NEUROLOGY

## 2022-12-18 PROCEDURE — 99233 PR SUBSEQUENT HOSPITAL CARE,LEVL III: ICD-10-PCS | Mod: ,,, | Performed by: PSYCHIATRY & NEUROLOGY

## 2022-12-18 PROCEDURE — 92610 EVALUATE SWALLOWING FUNCTION: CPT

## 2022-12-18 RX ORDER — OXYCODONE HYDROCHLORIDE 10 MG/1
10 TABLET ORAL EVERY 6 HOURS PRN
Status: DISCONTINUED | OUTPATIENT
Start: 2022-12-18 | End: 2022-12-21 | Stop reason: HOSPADM

## 2022-12-18 RX ORDER — HEPARIN SODIUM 5000 [USP'U]/ML
5000 INJECTION, SOLUTION INTRAVENOUS; SUBCUTANEOUS EVERY 8 HOURS
Status: DISCONTINUED | OUTPATIENT
Start: 2022-12-18 | End: 2022-12-21 | Stop reason: HOSPADM

## 2022-12-18 RX ORDER — GABAPENTIN 400 MG/1
400 CAPSULE ORAL 3 TIMES DAILY
Status: DISCONTINUED | OUTPATIENT
Start: 2022-12-18 | End: 2022-12-21 | Stop reason: HOSPADM

## 2022-12-18 RX ORDER — METHOCARBAMOL 750 MG/1
750 TABLET, FILM COATED ORAL 4 TIMES DAILY
Status: DISCONTINUED | OUTPATIENT
Start: 2022-12-18 | End: 2022-12-21 | Stop reason: HOSPADM

## 2022-12-18 RX ORDER — QUETIAPINE FUMARATE 25 MG/1
25 TABLET, FILM COATED ORAL NIGHTLY PRN
Status: DISCONTINUED | OUTPATIENT
Start: 2022-12-18 | End: 2022-12-20

## 2022-12-18 RX ADMIN — POLYETHYLENE GLYCOL 3350 17 G: 17 POWDER, FOR SOLUTION ORAL at 08:12

## 2022-12-18 RX ADMIN — GABAPENTIN 400 MG: 400 CAPSULE ORAL at 02:12

## 2022-12-18 RX ADMIN — METHOCARBAMOL 750 MG: 750 TABLET ORAL at 12:12

## 2022-12-18 RX ADMIN — LEVOTHYROXINE SODIUM 50 MCG: 50 TABLET ORAL at 05:12

## 2022-12-18 RX ADMIN — OXYCODONE 5 MG: 5 TABLET ORAL at 01:12

## 2022-12-18 RX ADMIN — MUPIROCIN: 20 OINTMENT TOPICAL at 08:12

## 2022-12-18 RX ADMIN — ATORVASTATIN CALCIUM 80 MG: 40 TABLET, FILM COATED ORAL at 08:12

## 2022-12-18 RX ADMIN — GABAPENTIN 400 MG: 400 CAPSULE ORAL at 09:12

## 2022-12-18 RX ADMIN — QUETIAPINE FUMARATE 25 MG: 25 TABLET ORAL at 09:12

## 2022-12-18 RX ADMIN — OXYCODONE HYDROCHLORIDE 10 MG: 10 TABLET ORAL at 09:12

## 2022-12-18 RX ADMIN — METHOCARBAMOL 750 MG: 750 TABLET ORAL at 05:12

## 2022-12-18 RX ADMIN — ACETAMINOPHEN 650 MG: 325 TABLET ORAL at 05:12

## 2022-12-18 RX ADMIN — CEFAZOLIN 2 G: 2 INJECTION, POWDER, FOR SOLUTION INTRAMUSCULAR; INTRAVENOUS at 07:12

## 2022-12-18 RX ADMIN — MUPIROCIN: 20 OINTMENT TOPICAL at 09:12

## 2022-12-18 RX ADMIN — GABAPENTIN 300 MG: 300 CAPSULE ORAL at 08:12

## 2022-12-18 RX ADMIN — OXYCODONE 5 MG: 5 TABLET ORAL at 07:12

## 2022-12-18 RX ADMIN — METHOCARBAMOL 500 MG: 500 TABLET ORAL at 08:12

## 2022-12-18 RX ADMIN — OXYCODONE HYDROCHLORIDE 10 MG: 10 TABLET ORAL at 12:12

## 2022-12-18 RX ADMIN — TAMSULOSIN HYDROCHLORIDE 0.4 MG: 0.4 CAPSULE ORAL at 08:12

## 2022-12-18 RX ADMIN — HEPARIN SODIUM 5000 UNITS: 5000 INJECTION INTRAVENOUS; SUBCUTANEOUS at 02:12

## 2022-12-18 RX ADMIN — ACETAMINOPHEN 650 MG: 325 TABLET ORAL at 08:12

## 2022-12-18 RX ADMIN — HEPARIN SODIUM 5000 UNITS: 5000 INJECTION INTRAVENOUS; SUBCUTANEOUS at 09:12

## 2022-12-18 RX ADMIN — METHOCARBAMOL 750 MG: 750 TABLET ORAL at 09:12

## 2022-12-18 NOTE — PROGRESS NOTES
[facility]  Otorhinolaryngology-Head & Neck Surgery  Progress Note    Subjective:     Post-Op Info:  Procedure(s) (LRB):  REPAIR, ETHMOID SINUS, ENDOSCOPIC, FOR CSF LEAK (Left)  FESS, USING COMPUTER-ASSISTED NAVIGATION (Bilateral)  LUMBAR PUNCTURE (N/A)   2 Days Post-Op  Hospital Day: 3     Interval History: NAEON. Reports feeling some SOB and coughing which worsens pressure in head. Denies any drainage.     Medications:  Continuous Infusions:  Scheduled Meds:   atorvastatin  80 mg Oral Daily    fluorescein  5 mL Intravenous Once    gabapentin  400 mg Oral TID    levothyroxine  50 mcg Oral Before breakfast    LIDOcaine (PF) 10 mg/ml (1%)  1 mL Intradermal Once    methocarbamoL  750 mg Oral QID    mupirocin   Nasal BID    polyethylene glycol  17 g Oral Daily    tamsulosin  0.4 mg Oral Daily     PRN Meds:acetaminophen, labetalol, oxyCODONE, QUEtiapine, sodium chloride 0.9%     Review of patient's allergies indicates:  No Known Allergies  Objective:     Vital Signs (24h Range):  Temp:  [98.2 °F (36.8 °C)-98.9 °F (37.2 °C)] 98.9 °F (37.2 °C)  Pulse:  [63-91] 70  Resp:  [14-33] 33  SpO2:  [91 %-98 %] 95 %  BP: (115-169)/(65-90) 135/78       Lines/Drains/Airways       Drain  Duration                  Lumbar Drain 12/16/22 2000 1 day    Male External Urinary Catheter 12/16/22 2030 Medium 1 day              Peripheral Intravenous Line  Duration                  Peripheral IV - Single Lumen 12/16/22 1402 18 G Posterior;Right Forearm 1 day         Peripheral IV - Single Lumen 12/16/22 2300 22 G Anterior;Right Forearm 1 day                    Physical Exam  Constitutional:       Appearance: Normal appearance.   HENT:      Nose:      Comments: No drainage      Mouth/Throat:      Pharynx: Oropharynx is clear.   Eyes:      Extraocular Movements: Extraocular movements intact.   Neurological:      Mental Status: He is alert.     Significant Labs:  CBC:   Recent Labs   Lab 12/18/22  0200   WBC 8.56   RBC  5.08   HGB 15.9   HCT 47.2      MCV 93   MCH 31.3*   MCHC 33.7     CMP:   Recent Labs   Lab 12/18/22  0200   GLU 90   CALCIUM 8.4*   ALBUMIN 3.0*   PROT 6.2      K 4.0   CO2 21*      BUN 11   CREATININE 0.8   ALKPHOS 79   ALT 54*   AST 26   BILITOT 0.8       Significant Diagnostics:  None    Assessment/Plan:     * Postoperative CSF leak  67 y.o M with prior FESS and development of CSF leak, s/p lumbar drain, defect noted to be at L cribriform plate, s/p  middle turbinate flap on 12/16. Postop CTH appropriate.     - CSF leak precautions   - Sinonasal precautions; no nose blowing, sneeze with mouth open   - No straining, stool softeners   - HOB at 30 degrees   - Keep LD in place at 5cc/hr, plan to clamp 12/19  - OK for IS           Lisa Cherry MD  Otorhinolaryngology-Head & Neck Surgery  [facility]

## 2022-12-18 NOTE — PT/OT/SLP EVAL
Speech Language Pathology Evaluation  Bedside Swallow    Patient Name:  Sarabjit Velasquez   MRN:  [id number]  Admitting Diagnosis: Postoperative CSF leak    Recommendations:                 General Recommendations:  Dysphagia therapy  Diet recommendations:  Regular, Thin, Nectar Thick   Aspiration Precautions: Assistance with thickening liquids   General Precautions: Standard,    Communication strategies:  none    History:     Past Medical History:   Diagnosis Date    Essential (primary) hypertension     Other hyperlipidemia      67 y.o M with prior FESS and development of CSF leak, s/p lumbar drain, defect noted to be at L cribriform plate, s/p  middle turbinate flap on 12/16. Postop CTH appropriate.      - CSF leak precautions          - Sinonasal precautions; no nose blowing, sneeze with mouth open          - No straining, stool softeners          - HOB at 30 degrees   - Keep LD in place at 5cc/hr, plan to clamp 12/19  - OK for IS        History reviewed. No pertinent surgical history.    Social History: Patient lives with spouse     Prior Intubation HX:     Modified Barium Swallow: none prior     Chest X-Rays:   FINDINGS:  Single chest view is submitted.  Monitoring leads overlie the patient.  There is diminished depth of inspiration and mild rotation, there is also mild elevation of the right hemidiaphragm.  These factors exaggerate the appearance of the cardiomediastinal silhouette which is otherwise thought appropriate when accounting for the aforementioned, and including appearance thought to represent aortic tortuosity or ectasia.  Mild aortic atherosclerotic change also noted.     There is prominence of the pulmonary vasculature bilaterally.  There is perihilar infiltrate bilaterally.  Accentuation of pulmonary bronchovascular markings consistent with diminished depth of inspiration noted however there is superimposed appearance of interstitial and ground-glass infiltrate bilaterally.     There is no  evidence for pleural effusion and no evidence for pneumothorax.  The osseous structures demonstrate chronic change.     Impression: Prominence of the pulmonary vascular with bilateral pattern of infiltrates as above.     Prior diet: regular unrestricted diet     Occupation/hobbies/homemaking: works part time     Subjective   Pt awake and alert   RN in agreement with seeing at this time   Spouse at the bedside     Pain/Comfort:  Pain Rating 1: 0/10  Pain Rating Post-Intervention 1: 0/10    Respiratory Status: Nasal cannula, flow 2 L/min    Objective:     Oral Musculature Evaluation  Oral Musculature: WFL  Dentition: present and adequate  Secretion Management: adequate  Oral Labial Strength and Mobility: WFL  Lingual Strength and Mobility:  (WFL)  Volitional Cough: weak  Volitional Swallow: prompt  Voice Prior to PO Intake: hoarse    Short of breath during conversation     Bedside Swallow Eval:   Consistencies Assessed:  Thin liquids single sips across 3oz   Nectar thick liquids 6oz via single sips   Solids x5      Oral Phase:   WFL    Pharyngeal Phase:   throat clearing and coughing prior to any PO trials with pt shortness of breath  Pt status Slightly improved with nectar   SLP discussed with pt at length trial of nectar thick liquids per pt preference. Thin liquids do not appear overtly unsafe however pt may prefer thickened liquids   Pt in agreement with plan and SLP will continue to follow to ensure diet tolerance     Treatment:      Education provided to Pt re: SLP role in acute care setting, overall impressions and therapeutic goals. Whiteboard updated.      Wdrduw-cycjrznz-xuxciiseu screen was initiated due to no significant evidence of speech, language or cognitive deficits and no report of mulggd-wqtbwqzv-ehqavsahq deficits/impairments. Spontaneous speech in conversation was WFL with 100% intelligibility without evidence of dysarthria or dysphonia. No evidence of verbal or oral apraxia. Naming and repetition  were WNL. Pt was Ox4 ind'ly and able to demonstrate adequate problem solving and reasoning skills. No evidence of neglect, aphasia or cognitive-linguistic deficits noted in today's screen/assessment     Assessment:     Sarabjit Velasquez is a 67 y.o. male with an SLP diagnosis of Dysphagia.      Goals:   Multidisciplinary Problems       SLP Goals          Problem: SLP    Goal Priority Disciplines Outcome   SLP Goal     SLP Ongoing, Progressing   Description: Speech Language Pathology Goals  Goals expected to be met by 12/25    Pt will participate in ongoing assessment of swallow function to help determine least restrictive diet                          Plan:     Patient to be seen:  4 x/week   Plan of Care expires:     Plan of Care reviewed with:      SLP Follow-Up:  Yes       Discharge recommendations:  home   Barriers to Discharge:  None    Time Tracking:     SLP Treatment Date:   12/18/22  Speech Start Time:  1056  Speech Stop Time:  1113     Speech Total Time (min):  17 min    Billable Minutes: Eval Swallow and Oral Function 8 and Self Care/Home Management Training 9    12/18/2022

## 2022-12-18 NOTE — SUBJECTIVE & OBJECTIVE
Interval History: 12/18: AF, 115-169 SBP. NAEON. LD day 2/3, 5cc/hr. Exam stable    Medications:  Continuous Infusions:  Scheduled Meds:   atorvastatin  80 mg Oral Daily    fluorescein  5 mL Intravenous Once    gabapentin  300 mg Oral TID    levothyroxine  50 mcg Oral Before breakfast    LIDOcaine (PF) 10 mg/ml (1%)  1 mL Intradermal Once    methocarbamoL  500 mg Oral QID    mupirocin   Nasal BID    polyethylene glycol  17 g Oral Daily    tamsulosin  0.4 mg Oral Daily     PRN Meds:acetaminophen, labetalol, oxyCODONE, QUEtiapine, sodium chloride 0.9%     Review of Systems  Objective:     Weight: 88.9 kg (196 lb)  Body mass index is 28.94 kg/m².  Vital Signs (Most Recent):  Temp: 98.9 °F (37.2 °C) (12/18/22 0424)  Pulse: 70 (12/18/22 0605)  Resp: (!) 33 (12/18/22 0718)  BP: 135/78 (12/18/22 0605)  SpO2: 95 % (12/18/22 0605)   Vital Signs (24h Range):  Temp:  [98.2 °F (36.8 °C)-98.9 °F (37.2 °C)] 98.9 °F (37.2 °C)  Pulse:  [63-91] 70  Resp:  [14-33] 33  SpO2:  [91 %-98 %] 95 %  BP: (115-169)/(65-90) 135/78                     Male External Urinary Catheter 12/16/22 2030 Medium (Active)   Collection Container Urimeter 12/18/22 0305   Securement Method secured to top of thigh w/ leg strap 12/18/22 0305   Skin no breakdown;no redness 12/18/22 0305   Tolerance no signs/symptoms of discomfort 12/18/22 0305   Output (mL) 475 mL 12/18/22 0605   Catheter Change Date 12/17/22 12/18/22 0305   Catheter Change Time 2030 12/17/22 0305            Lumbar Drain 12/16/22 2000 (Active)   Drain Status Clamped 12/18/22 0305   Drain Level (cm) 10 cm 12/17/22 1905   Level to other (see comment) 12/18/22 0305   CSF Color Other (Comment) 12/18/22 0305   Site Description Healing 12/18/22 0305   Dressing Status Intact 12/18/22 0305   Interventions HOB degrees (see comment);Bed controls locked 12/18/22 0305   Output (mL) 5 mL 12/18/22 0605       Physical Exam    Neurosurgery Physical Exam  E4V5M6  Aox3  Cni, PERRL  Fcx4 AG  SILT     No drift  LD  site c/d/I with tegaderms securing  Significant Labs:  Recent Labs   Lab 12/16/22 2037 12/18/22  0200    90    139   K 4.6 4.0    106   CO2 24 21*   BUN 16 11   CREATININE 0.8 0.8   CALCIUM 8.4* 8.4*   MG 2.0 2.2     Recent Labs   Lab 12/16/22 2037 12/18/22  0200   WBC 10.32 8.56   HGB 15.0 15.9   HCT 45.1 47.2    179     Recent Labs   Lab 12/16/22 2037   INR 1.1   APTT 24.3     Microbiology Results (last 7 days)       ** No results found for the last 168 hours. **          All pertinent labs from the last 24 hours have been reviewed.    Significant Diagnostics:  I have reviewed and interpreted all pertinent imaging results/findings within the past 24 hours.  CT Head Without Contrast    Result Date: 12/17/2022  No acute intracranial process. Osseous defects left greater than right cribriform plates and left lateral lamella. Electronically signed by: Pilo Mock Date:    12/17/2022 Time:    08:47

## 2022-12-18 NOTE — ASSESSMENT & PLAN NOTE
67 y.o M with prior FESS and development of CSF leak, s/p lumbar drain, defect noted to be at L cribriform plate, s/p  middle turbinate flap on 12/16. Postop CTH appropriate.     - CSF leak precautions   - Sinonasal precautions; no nose blowing, sneeze with mouth open   - No straining, stool softeners   - HOB at 30 degrees   - Keep LD in place at 5cc/hr, plan to clamp 12/19  - OK for IS

## 2022-12-18 NOTE — PLAN OF CARE
[facility] Care Plan    POC reviewed with Sarabjit Recio Ron and family at 1400. Pt verbalized understanding. Questions and concerns addressed. No acute events today. Pt progressing toward goals. Will continue to monitor. See below and flowsheets for full assessment and VS info.   -5cc CSF drained hourly from lumbar drain  -Pt up in chair throughout most of shift with permission from ENT + [facility] team  - PRN oxy given for headache and back pain  -gabapentin and robaxin dose increased for better pain management  -tentative plan to clamp drain tomorrow          Is this a stroke patient? no    Neuro:  Alberto Coma Scale  Best Eye Response: 4-->(E4) spontaneous  Best Motor Response: 6-->(M6) obeys commands  Best Verbal Response: 5-->(V5) oriented  [place] Coma Scale Score: 15  Assessment Qualifiers: patient not sedated/intubated  Pupil PERRLA: yes     24 hr Temp:  [98 °F (36.7 °C)-98.9 °F (37.2 °C)]     CV:   Rhythm: normal sinus rhythm  BP goals:   SBP < 180  MAP > 65    Resp:           Plan: N/A    GI/:     Diet/Nutrition Received: regular  Last Bowel Movement: 12/18/22  Voiding Characteristics: external catheter    Intake/Output Summary (Last 24 hours) at 12/18/2022 1434  Last data filed at 12/18/2022 1400  Gross per 24 hour   Intake 1515 ml   Output 3670 ml   Net -2155 ml     Unmeasured Output  Stool Occurrence: 1    Labs/Accuchecks:  Recent Labs   Lab 12/18/22  0200   WBC 8.56   RBC 5.08   HGB 15.9   HCT 47.2         Recent Labs   Lab 12/18/22  0200      K 4.0   CO2 21*      BUN 11   CREATININE 0.8   ALKPHOS 79   ALT 54*   AST 26   BILITOT 0.8      Recent Labs   Lab 12/16/22 2037   INR 1.1   APTT 24.3    No results for input(s): CPK, CPKMB, TROPONINI, MB in the last 168 hours.    Electrolytes: N/A - electrolytes WDL  Accuchecks: none    Gtts:      LDA/Wounds:  Lines/Drains/Airways       Drain  Duration                  Lumbar Drain 12/16/22 2000 1 day    Male External Urinary Catheter 12/16/22 2030  Medium 1 day              Peripheral Intravenous Line  Duration                  Peripheral IV - Single Lumen 12/16/22 1402 18 G Posterior;Right Forearm 2 days         Peripheral IV - Single Lumen 12/16/22 2300 22 G Anterior;Right Forearm 1 day                  Wounds: No  Wound care consulted: No

## 2022-12-18 NOTE — SUBJECTIVE & OBJECTIVE
Interval History:  See hospital course above.     Review of Systems   Constitutional:  Negative for chills, fatigue and fever.   HENT:  Positive for congestion (mild). Negative for facial swelling, nosebleeds, postnasal drip, rhinorrhea, sore throat and trouble swallowing.    Eyes:  Negative for photophobia and visual disturbance.   Respiratory:  Negative for shortness of breath.    Cardiovascular:  Negative for chest pain.   Gastrointestinal:  Negative for abdominal pain, nausea and vomiting.   Genitourinary:  Positive for difficulty urinating (history of retention). Negative for dysuria.   Musculoskeletal:  Negative for neck pain and neck stiffness.   Neurological:  Positive for headaches. Negative for facial asymmetry, speech difficulty and weakness.   Psychiatric/Behavioral:  Negative for agitation and confusion.      Objective:     Vitals:  Temp: 98.1 °F (36.7 °C)  Pulse: 77  Rhythm: normal sinus rhythm  BP: 122/66  MAP (mmHg): 86  Resp: (!) 22  SpO2: (!) 94 %    Temp  Min: 98 °F (36.7 °C)  Max: 98.9 °F (37.2 °C)  Pulse  Min: 63  Max: 91  BP  Min: 115/69  Max: 169/86  MAP (mmHg)  Min: 82  Max: 123  Resp  Min: 14  Max: 33  SpO2  Min: 90 %  Max: 98 %    12/17 0701 - 12/18 0700  In: 1115 [P.O.:300; I.V.:315]  Out: 4025 [Urine:3900; Drains:125]   Unmeasured Output  Stool Occurrence: 1       Physical Exam  Vitals and nursing note reviewed.   Constitutional:       General: He is not in acute distress.     Appearance: Normal appearance.      Comments: Well developed. Well nourished. Resting comfortably in bed.   HENT:      Head: Normocephalic and atraumatic.      Right Ear: External ear normal.      Left Ear: External ear normal.      Nose: Nose normal.      Mouth/Throat:      Mouth: Mucous membranes are moist.      Pharynx: Oropharynx is clear.   Eyes:      General: No scleral icterus.     Extraocular Movements: Extraocular movements intact.      Pupils: Pupils are equal, round, and reactive to light.    Cardiovascular:      Rate and Rhythm: Normal rate and regular rhythm.   Pulmonary:      Effort: Pulmonary effort is normal. No respiratory distress.   Abdominal:      General: Abdomen is flat. There is no distension.   Musculoskeletal:      Right lower leg: No edema.      Left lower leg: No edema.   Skin:     General: Skin is warm and dry.   Neurological:      Mental Status: He is alert.      Comments: E4V5M6  Awake, alert, & oriented x 4. Speech fluent. Follows commands briskly.   PERRL. EOMI. No facial asymmetry. Conversational hearing intact.   Moves all extremities spontaneously, symmetrically, and against gravity. Strength 5/5 & SILT in all 4 extremities.     Gait & coordination exams deferred.     Medications:  Continuous Scheduledatorvastatin, 80 mg, Daily  gabapentin, 400 mg, TID  heparin (porcine), 5,000 Units, Q8H  levothyroxine, 50 mcg, Before breakfast  methocarbamoL, 750 mg, QID  mupirocin, , BID  polyethylene glycol, 17 g, Daily  tamsulosin, 0.4 mg, Daily    PRNacetaminophen, 650 mg, Q6H PRN  labetalol, 10 mg, Q6H PRN  oxyCODONE, 10 mg, Q6H PRN  QUEtiapine, 25 mg, Nightly PRN  sodium chloride 0.9%, 10 mL, PRN      Today I personally reviewed pertinent medications, lines/drains/airways, laboratory results, notably:    Laboratory:  CBC:  Recent Labs   Lab 12/18/22  0200   WBC 8.56   RBC 5.08   HGB 15.9   HCT 47.2      MCV 93   MCH 31.3*   MCHC 33.7       CMP:  Recent Labs   Lab 12/18/22  0200   CALCIUM 8.4*   PROT 6.2      K 4.0   CO2 21*      BUN 11   CREATININE 0.8   ALKPHOS 79   ALT 54*   AST 26   BILITOT 0.8           Diet  Diet Adult Regular (IDDSI Level 7)  Diet Adult Regular (IDDSI Level 7)

## 2022-12-18 NOTE — ASSESSMENT & PLAN NOTE
67M with LD placed on 12/16/22 for ethmoidal CSF fistual repair with ENT    Plan:  --icu   --q1 nc/vs icu, q2 stepdown, q4 floor  --all labs and significant diagnostics reviewed  --LD @ 5cc/hr x3 days (Day 2/3)  --please notfiy nsgy of any acute neurological decline    Dispo: icu while LD in place; TTF to ENT after LD removed

## 2022-12-18 NOTE — SUBJECTIVE & OBJECTIVE
Interval History: NAEON. Reports feeling some SOB and coughing which worsens pressure in head. Denies any drainage.     Medications:  Continuous Infusions:  Scheduled Meds:   atorvastatin  80 mg Oral Daily    fluorescein  5 mL Intravenous Once    gabapentin  400 mg Oral TID    levothyroxine  50 mcg Oral Before breakfast    LIDOcaine (PF) 10 mg/ml (1%)  1 mL Intradermal Once    methocarbamoL  750 mg Oral QID    mupirocin   Nasal BID    polyethylene glycol  17 g Oral Daily    tamsulosin  0.4 mg Oral Daily     PRN Meds:acetaminophen, labetalol, oxyCODONE, QUEtiapine, sodium chloride 0.9%     Review of patient's allergies indicates:  No Known Allergies  Objective:     Vital Signs (24h Range):  Temp:  [98.2 °F (36.8 °C)-98.9 °F (37.2 °C)] 98.9 °F (37.2 °C)  Pulse:  [63-91] 70  Resp:  [14-33] 33  SpO2:  [91 %-98 %] 95 %  BP: (115-169)/(65-90) 135/78       Lines/Drains/Airways       Drain  Duration                  Lumbar Drain 12/16/22 2000 1 day    Male External Urinary Catheter 12/16/22 2030 Medium 1 day              Peripheral Intravenous Line  Duration                  Peripheral IV - Single Lumen 12/16/22 1402 18 G Posterior;Right Forearm 1 day         Peripheral IV - Single Lumen 12/16/22 2300 22 G Anterior;Right Forearm 1 day                    Physical Exam  Constitutional:       Appearance: Normal appearance.   HENT:      Nose:      Comments: No drainage      Mouth/Throat:      Pharynx: Oropharynx is clear.   Eyes:      Extraocular Movements: Extraocular movements intact.   Neurological:      Mental Status: He is alert.     Significant Labs:  CBC:   Recent Labs   Lab 12/18/22  0200   WBC 8.56   RBC 5.08   HGB 15.9   HCT 47.2      MCV 93   MCH 31.3*   MCHC 33.7     CMP:   Recent Labs   Lab 12/18/22  0200   GLU 90   CALCIUM 8.4*   ALBUMIN 3.0*   PROT 6.2      K 4.0   CO2 21*      BUN 11   CREATININE 0.8   ALKPHOS 79   ALT 54*   AST 26   BILITOT 0.8       Significant Diagnostics:  None

## 2022-12-18 NOTE — PLAN OF CARE
[facility] Care Plan    POC reviewed with Sarabjit Velasquez and family at 0300. Pt verbalized understanding. Questions and concerns addressed. No acute events overnight. Pt progressing toward goals. Will continue to monitor. See below and flowsheets for full assessment and VS info.           Is this a stroke patient? no    Neuro:  Alberto Coma Scale  Best Eye Response: 4-->(E4) spontaneous  Best Motor Response: 6-->(M6) obeys commands  Best Verbal Response: 5-->(V5) oriented  [place] Coma Scale Score: 15  Assessment Qualifiers: patient not sedated/intubated  Pupil PERRLA: yes     24hr Temp:  [98.2 °F (36.8 °C)-98.9 °F (37.2 °C)]     CV:   Rhythm: normal sinus rhythm  BP goals:   SBP < 180  MAP >   65  Resp:           Plan: N/A    GI/:     Diet/Nutrition Received: regular  Last Bowel Movement: 12/17/22  Voiding Characteristics: external catheter    Intake/Output Summary (Last 24 hours) at 12/18/2022 0411  Last data filed at 12/18/2022 0405  Gross per 24 hour   Intake 550 ml   Output 2670 ml   Net -2120 ml          Labs/Accuchecks:  Recent Labs   Lab 12/18/22  0200   WBC 8.56   RBC 5.08   HGB 15.9   HCT 47.2         Recent Labs   Lab 12/18/22  0200      K 4.0   CO2 21*      BUN 11   CREATININE 0.8   ALKPHOS 79   ALT 54*   AST 26   BILITOT 0.8      Recent Labs   Lab 12/16/22 2037   INR 1.1   APTT 24.3    No results for input(s): CPK, CPKMB, TROPONINI, MB in the last 168 hours.    Electrolytes: N/A - electrolytes WDL  Accuchecks: none    Gtts:      LDA/Wounds:  Lines/Drains/Airways       Drain  Duration                  Lumbar Drain 12/16/22 2000 1 day    Male External Urinary Catheter 12/16/22 2030 Medium 1 day              Peripheral Intravenous Line  Duration                  Peripheral IV - Single Lumen 12/16/22 1402 18 G Posterior;Right Forearm 1 day         Peripheral IV - Single Lumen 12/16/22 2300 22 G Anterior;Right Forearm 1 day                  Wounds: Yes  Wound care consulted: No

## 2022-12-19 PROBLEM — E03.9 ACQUIRED HYPOTHYROIDISM: Status: ACTIVE | Noted: 2022-12-16

## 2022-12-19 LAB
ALBUMIN SERPL BCP-MCNC: 3 G/DL (ref 3.5–5.2)
ALP SERPL-CCNC: 83 U/L (ref 55–135)
ALT SERPL W/O P-5'-P-CCNC: 55 U/L (ref 10–44)
ANION GAP SERPL CALC-SCNC: 9 MMOL/L (ref 8–16)
AST SERPL-CCNC: 28 U/L (ref 10–40)
BASOPHILS # BLD AUTO: 0.03 K/UL (ref 0–0.2)
BASOPHILS NFR BLD: 0.6 % (ref 0–1.9)
BILIRUB SERPL-MCNC: 0.6 MG/DL (ref 0.1–1)
BUN SERPL-MCNC: 12 MG/DL (ref 8–23)
CALCIUM SERPL-MCNC: 8.8 MG/DL (ref 8.7–10.5)
CHLORIDE SERPL-SCNC: 105 MMOL/L (ref 95–110)
CO2 SERPL-SCNC: 27 MMOL/L (ref 23–29)
CREAT SERPL-MCNC: 0.8 MG/DL (ref 0.5–1.4)
DIFFERENTIAL METHOD: ABNORMAL
EOSINOPHIL # BLD AUTO: 0.2 K/UL (ref 0–0.5)
EOSINOPHIL NFR BLD: 3.2 % (ref 0–8)
ERYTHROCYTE [DISTWIDTH] IN BLOOD BY AUTOMATED COUNT: 14.8 % (ref 11.5–14.5)
EST. GFR  (NO RACE VARIABLE): >60 ML/MIN/1.73 M^2
GLUCOSE SERPL-MCNC: 98 MG/DL (ref 70–110)
HCT VFR BLD AUTO: 48.3 % (ref 40–54)
HGB BLD-MCNC: 16 G/DL (ref 14–18)
IMM GRANULOCYTES # BLD AUTO: 0.04 K/UL (ref 0–0.04)
IMM GRANULOCYTES NFR BLD AUTO: 0.9 % (ref 0–0.5)
LYMPHOCYTES # BLD AUTO: 1.4 K/UL (ref 1–4.8)
LYMPHOCYTES NFR BLD: 29.7 % (ref 18–48)
MAGNESIUM SERPL-MCNC: 2.4 MG/DL (ref 1.6–2.6)
MCH RBC QN AUTO: 31.9 PG (ref 27–31)
MCHC RBC AUTO-ENTMCNC: 33.1 G/DL (ref 32–36)
MCV RBC AUTO: 96 FL (ref 82–98)
MONOCYTES # BLD AUTO: 0.5 K/UL (ref 0.3–1)
MONOCYTES NFR BLD: 11 % (ref 4–15)
NEUTROPHILS # BLD AUTO: 2.5 K/UL (ref 1.8–7.7)
NEUTROPHILS NFR BLD: 54.6 % (ref 38–73)
NRBC BLD-RTO: 0 /100 WBC
PHOSPHATE SERPL-MCNC: 4.2 MG/DL (ref 2.7–4.5)
PLATELET # BLD AUTO: 177 K/UL (ref 150–450)
PMV BLD AUTO: 9.7 FL (ref 9.2–12.9)
POTASSIUM SERPL-SCNC: 3.9 MMOL/L (ref 3.5–5.1)
PROT SERPL-MCNC: 6.3 G/DL (ref 6–8.4)
RBC # BLD AUTO: 5.01 M/UL (ref 4.6–6.2)
SODIUM SERPL-SCNC: 141 MMOL/L (ref 136–145)
WBC # BLD AUTO: 4.65 K/UL (ref 3.9–12.7)

## 2022-12-19 PROCEDURE — 63600175 PHARM REV CODE 636 W HCPCS: Performed by: STUDENT IN AN ORGANIZED HEALTH CARE EDUCATION/TRAINING PROGRAM

## 2022-12-19 PROCEDURE — 25000003 PHARM REV CODE 250: Performed by: PSYCHIATRY & NEUROLOGY

## 2022-12-19 PROCEDURE — 80053 COMPREHEN METABOLIC PANEL: CPT

## 2022-12-19 PROCEDURE — 25000003 PHARM REV CODE 250

## 2022-12-19 PROCEDURE — 25000003 PHARM REV CODE 250: Performed by: STUDENT IN AN ORGANIZED HEALTH CARE EDUCATION/TRAINING PROGRAM

## 2022-12-19 PROCEDURE — 94761 N-INVAS EAR/PLS OXIMETRY MLT: CPT

## 2022-12-19 PROCEDURE — 84100 ASSAY OF PHOSPHORUS: CPT

## 2022-12-19 PROCEDURE — 83735 ASSAY OF MAGNESIUM: CPT

## 2022-12-19 PROCEDURE — 99233 SBSQ HOSP IP/OBS HIGH 50: CPT | Mod: ,,, | Performed by: PSYCHIATRY & NEUROLOGY

## 2022-12-19 PROCEDURE — 99233 PR SUBSEQUENT HOSPITAL CARE,LEVL III: ICD-10-PCS | Mod: ,,, | Performed by: PSYCHIATRY & NEUROLOGY

## 2022-12-19 PROCEDURE — 97535 SELF CARE MNGMENT TRAINING: CPT

## 2022-12-19 PROCEDURE — 63600175 PHARM REV CODE 636 W HCPCS

## 2022-12-19 PROCEDURE — 92526 ORAL FUNCTION THERAPY: CPT

## 2022-12-19 PROCEDURE — 85025 COMPLETE CBC W/AUTO DIFF WBC: CPT

## 2022-12-19 PROCEDURE — 20000000 HC ICU ROOM

## 2022-12-19 PROCEDURE — 27000221 HC OXYGEN, UP TO 24 HOURS

## 2022-12-19 RX ORDER — AMOXICILLIN 250 MG
1 CAPSULE ORAL 2 TIMES DAILY
Status: DISCONTINUED | OUTPATIENT
Start: 2022-12-19 | End: 2022-12-21 | Stop reason: HOSPADM

## 2022-12-19 RX ADMIN — HEPARIN SODIUM 5000 UNITS: 5000 INJECTION INTRAVENOUS; SUBCUTANEOUS at 05:12

## 2022-12-19 RX ADMIN — ACETAMINOPHEN 650 MG: 325 TABLET ORAL at 04:12

## 2022-12-19 RX ADMIN — MUPIROCIN: 20 OINTMENT TOPICAL at 08:12

## 2022-12-19 RX ADMIN — METHOCARBAMOL 750 MG: 750 TABLET ORAL at 05:12

## 2022-12-19 RX ADMIN — Medication 2 SPRAY: at 09:12

## 2022-12-19 RX ADMIN — CEFTRIAXONE 2 G: 2 INJECTION, POWDER, FOR SOLUTION INTRAMUSCULAR; INTRAVENOUS at 08:12

## 2022-12-19 RX ADMIN — Medication 2 SPRAY: at 08:12

## 2022-12-19 RX ADMIN — POLYETHYLENE GLYCOL 3350 17 G: 17 POWDER, FOR SOLUTION ORAL at 08:12

## 2022-12-19 RX ADMIN — TAMSULOSIN HYDROCHLORIDE 0.4 MG: 0.4 CAPSULE ORAL at 08:12

## 2022-12-19 RX ADMIN — METHOCARBAMOL 750 MG: 750 TABLET ORAL at 12:12

## 2022-12-19 RX ADMIN — METHOCARBAMOL 750 MG: 750 TABLET ORAL at 08:12

## 2022-12-19 RX ADMIN — SENNOSIDES AND DOCUSATE SODIUM 1 TABLET: 50; 8.6 TABLET ORAL at 09:12

## 2022-12-19 RX ADMIN — OXYCODONE HYDROCHLORIDE 10 MG: 10 TABLET ORAL at 02:12

## 2022-12-19 RX ADMIN — GABAPENTIN 400 MG: 400 CAPSULE ORAL at 08:12

## 2022-12-19 RX ADMIN — QUETIAPINE FUMARATE 25 MG: 25 TABLET ORAL at 09:12

## 2022-12-19 RX ADMIN — GABAPENTIN 400 MG: 400 CAPSULE ORAL at 09:12

## 2022-12-19 RX ADMIN — METHOCARBAMOL 750 MG: 750 TABLET ORAL at 09:12

## 2022-12-19 RX ADMIN — OXYCODONE HYDROCHLORIDE 10 MG: 10 TABLET ORAL at 09:12

## 2022-12-19 RX ADMIN — GABAPENTIN 400 MG: 400 CAPSULE ORAL at 03:12

## 2022-12-19 RX ADMIN — Medication 2 SPRAY: at 03:12

## 2022-12-19 RX ADMIN — ATORVASTATIN CALCIUM 80 MG: 40 TABLET, FILM COATED ORAL at 08:12

## 2022-12-19 RX ADMIN — HEPARIN SODIUM 5000 UNITS: 5000 INJECTION INTRAVENOUS; SUBCUTANEOUS at 09:12

## 2022-12-19 RX ADMIN — HEPARIN SODIUM 5000 UNITS: 5000 INJECTION INTRAVENOUS; SUBCUTANEOUS at 02:12

## 2022-12-19 RX ADMIN — LEVOTHYROXINE SODIUM 50 MCG: 50 TABLET ORAL at 05:12

## 2022-12-19 NOTE — ASSESSMENT & PLAN NOTE
67M with LD placed on 12/16/22 for ethmoidal CSF fistual repair with ENT    Plan:   --icu   --q1 nc/vs icu, q2 stepdown, q4 floor  --all labs and significant diagnostics reviewed  --LD @ 5cc/hr x3 days (Day 3/3); clamp today at 5pm. Anticipate removal tomorrow  --please notfiy nsgy of any acute neurological decline    Dispo: icu while LD in place; clamp today; pull tomorrow; will stepdown to ENT

## 2022-12-19 NOTE — PLAN OF CARE
[facility] Care Plan    POC reviewed with Sarabjit Velasquez and family at 0300. Pt verbalized understanding. Questions and concerns addressed. No acute events overnight. Pt progressing toward goals. Will continue to monitor. See below and flowsheets for full assessment and VS info.     -lumbar drain in place, drain 5ccs/hour  -      Is this a stroke patient? no    Neuro:  Alberto Coma Scale  Best Eye Response: 4-->(E4) spontaneous  Best Motor Response: 6-->(M6) obeys commands  Best Verbal Response: 5-->(V5) oriented  Alberto Coma Scale Score: 15  Assessment Qualifiers: patient not sedated/intubated  Pupil PERRLA: yes     24hr Temp:  [97.2 °F (36.2 °C)-98.5 °F (36.9 °C)]     CV:   Rhythm: normal sinus rhythm  BP goals:   SBP < 160  MAP > 65    Resp:           Plan: N/A    GI/:     Diet/Nutrition Received: regular  Last Bowel Movement: 12/18/22  Voiding Characteristics: external catheter    Intake/Output Summary (Last 24 hours) at 12/19/2022 0428  Last data filed at 12/19/2022 0405  Gross per 24 hour   Intake 1435 ml   Output 4020 ml   Net -2585 ml     Unmeasured Output  Stool Occurrence: 1    Labs/Accuchecks:  Recent Labs   Lab 12/19/22  0358   WBC 4.65   RBC 5.01   HGB 16.0   HCT 48.3         Recent Labs   Lab 12/18/22  0200      K 4.0   CO2 21*      BUN 11   CREATININE 0.8   ALKPHOS 79   ALT 54*   AST 26   BILITOT 0.8      Recent Labs   Lab 12/16/22 2037   INR 1.1   APTT 24.3    No results for input(s): CPK, CPKMB, TROPONINI, MB in the last 168 hours.    Electrolytes: N/A - electrolytes WDL  Accuchecks: none    Gtts:      LDA/Wounds:  Lines/Drains/Airways       Drain  Duration                  Lumbar Drain 12/16/22 2000 2 days    Male External Urinary Catheter 12/16/22 2030 Medium 2 days              Peripheral Intravenous Line  Duration                  Peripheral IV - Single Lumen 12/16/22 1402 18 G Posterior;Right Forearm 2 days         Peripheral IV - Single Lumen 12/16/22 2300 22 G  Anterior;Right Forearm 2 days                  Wounds: Yes  Wound care consulted: No

## 2022-12-19 NOTE — PROGRESS NOTES
[facility]  Neurosurgery  Progress Note    Subjective:     History of Present Illness: Sarabjit Velasquez is a 67 y.o. male who presents for evaluation of a possible CSF leak, which has been present for about 1 week. He reports having revision ESS on 12/8/22 with Dr. Thomson who noted some thinning of the skull base during surgery. Patient reports that he put some mesh over the defect. Since surgery has been having mostly left sided nasal drainage, clear in nature. Causing him to cough significantly keeping him up at night. Reports some head aches and low grade fevers (around 100F). He denies any changes in his mental status, no neck stiffness, no photophobia or nausea/vomiting. He was started on Cefuroxime by Dr. Thomson. He reports having previous ESS x 2, one in 2016, and the most recent one last week. Was told there was some fungus within one of his sinuses.       Post-Op Info:  Procedure(s) (LRB):  REPAIR, ETHMOID SINUS, ENDOSCOPIC, FOR CSF LEAK (Left)  FESS, USING COMPUTER-ASSISTED NAVIGATION (Bilateral)  LUMBAR PUNCTURE (N/A)   3 Days Post-Op     Interval History: 12/19: NAEON. AFVSS. Exam stable. LD functional. Plan for clamp today and pull tomorrow.     Medications:  Continuous Infusions:  Scheduled Meds:   atorvastatin  80 mg Oral Daily    cefTRIAXone (ROCEPHIN) IVPB  2 g Intravenous Q24H    gabapentin  400 mg Oral TID    heparin (porcine)  5,000 Units Subcutaneous Q8H    levothyroxine  50 mcg Oral Before breakfast    methocarbamoL  750 mg Oral QID    polyethylene glycol  17 g Oral Daily    senna-docusate 8.6-50 mg  1 tablet Oral BID    sodium chloride  2 spray Each Nostril TID    tamsulosin  0.4 mg Oral Daily     PRN Meds:acetaminophen, labetalol, oxyCODONE, QUEtiapine, sodium chloride 0.9%     Review of Systems  Objective:     Weight: 88.9 kg (196 lb)  Body mass index is 28.94 kg/m².  Vital Signs (Most Recent):  Temp: 97.7 °F (36.5 °C) (12/19/22 0705)  Pulse: 71 (12/19/22  0905)  Resp: (!) 21 (12/19/22 0905)  BP: (!) 160/91 (12/19/22 0905)  SpO2: 96 % (12/19/22 0905)   Vital Signs (24h Range):  Temp:  [97.2 °F (36.2 °C)-98.1 °F (36.7 °C)] 97.7 °F (36.5 °C)  Pulse:  [55-84] 71  Resp:  [11-32] 21  SpO2:  [93 %-98 %] 96 %  BP: (113-160)/(60-92) 160/91     Date 12/19/22 0700 - 12/20/22 0659   Shift 1734-0317 6844-0679 7036-3478 24 Hour Total   INTAKE   P.O. 250   250   IV Piggyback 50.6   50.6   Shift Total(mL/kg) 300.6(3.4)   300.6(3.4)   OUTPUT   Drains 5   5   Shift Total(mL/kg) 5(0.1)   5(0.1)   Weight (kg) 88.9 88.9 88.9 88.9                   Male External Urinary Catheter 12/16/22 2030 Medium (Active)   Collection Container Urimeter 12/18/22 0305   Securement Method secured to top of thigh w/ leg strap 12/18/22 0305   Skin no breakdown;no redness 12/18/22 0305   Tolerance no signs/symptoms of discomfort 12/18/22 0305   Output (mL) 475 mL 12/18/22 0605   Catheter Change Date 12/17/22 12/18/22 0305   Catheter Change Time 2030 12/17/22 0305            Lumbar Drain 12/16/22 2000 (Active)   Drain Status Clamped 12/18/22 0305   Drain Level (cm) 10 cm 12/17/22 1905   Level to other (see comment) 12/18/22 0305   CSF Color Other (Comment) 12/18/22 0305   Site Description Healing 12/18/22 0305   Dressing Status Intact 12/18/22 0305   Interventions HOB degrees (see comment);Bed controls locked 12/18/22 0305   Output (mL) 5 mL 12/18/22 0605       Physical Exam    Neurosurgery Physical Exam  [id number]  Aox3  Cni, PERRL  Fcx4 AG  SILT     No drift  LD site c/d/I with tegaderms securing  Significant Labs:  Recent Labs   Lab 12/18/22  0200 12/19/22  0358   GLU 90 98    141   K 4.0 3.9    105   CO2 21* 27   BUN 11 12   CREATININE 0.8 0.8   CALCIUM 8.4* 8.8   MG 2.2 2.4       Recent Labs   Lab 12/18/22  0200 12/19/22  0358   WBC 8.56 4.65   HGB 15.9 16.0   HCT 47.2 48.3    177       No results for input(s): LABPT, INR, APTT in the last 48 hours.    Microbiology Results (last 7 days)        ** No results found for the last 168 hours. **          All pertinent labs from the last 24 hours have been reviewed.    Significant Diagnostics:  I have reviewed and interpreted all pertinent imaging results/findings within the past 24 hours.  No results found in the last 24 hours.    Assessment/Plan:     Status post functional endoscopic sinus surgery (FESS)  67M with LD placed on 12/16/22 for ethmoidal CSF fistual repair with ENT    Plan:   --icu   --q1 nc/vs icu, q2 stepdown, q4 floor  --all labs and significant diagnostics reviewed  --LD @ 5cc/hr x3 days (Day 3/3); clamp today at 5pm. Anticipate removal tomorrow  --please notfiy nsgy of any acute neurological decline    Dispo: icu while LD in place; clamp today; pull tomorrow; will stepdown to ENT        Phani Stoddard MD  Neurosurgery  [facility]

## 2022-12-19 NOTE — ASSESSMENT & PLAN NOTE
68 y/o m w/ pmhx of coronary artery atheroma, HTN, hypothyroidism, urinary retention, frequent sinus infections presents post op CSF leak repair. CSF leak after sinus surgery approximately 1 week prior to admission.     - q1h neuro checks, vitals, I/Os   - NSGY and ENT following   - CTH (12/16) with expected post op changes   - Lumbar drain clamped  - SBP goals <160  - PRN labetalol, hydralazine  - HOB restriction to <30 degrees  -No post op imaging per ENT  -PT/INR, aPTT pending  -CBC, CMP, Mag, Phos daily   -PRN pain, nausea medication  -SLP  -PT/OT once HOB restrictions no longer required

## 2022-12-19 NOTE — SUBJECTIVE & OBJECTIVE
Interval History: NAEON. No drainage    Medications:  Continuous Infusions:  Scheduled Meds:   atorvastatin  80 mg Oral Daily    cefTRIAXone (ROCEPHIN) IVPB  2 g Intravenous Q24H    gabapentin  400 mg Oral TID    heparin (porcine)  5,000 Units Subcutaneous Q8H    levothyroxine  50 mcg Oral Before breakfast    methocarbamoL  750 mg Oral QID    mupirocin   Nasal BID    polyethylene glycol  17 g Oral Daily    sodium chloride  2 spray Each Nostril TID    tamsulosin  0.4 mg Oral Daily     PRN Meds:acetaminophen, labetalol, oxyCODONE, QUEtiapine, sodium chloride 0.9%     Review of patient's allergies indicates:  No Known Allergies  Objective:     Vital Signs (24h Range):  Temp:  [97.2 °F (36.2 °C)-98.1 °F (36.7 °C)] 97.2 °F (36.2 °C)  Pulse:  [57-88] 58  Resp:  [11-33] 11  SpO2:  [90 %-98 %] 97 %  BP: (113-157)/(60-92) 128/70       Lines/Drains/Airways       Drain  Duration                  Lumbar Drain 12/16/22 2000 2 days    Male External Urinary Catheter 12/16/22 2030 Medium 2 days              Peripheral Intravenous Line  Duration                  Peripheral IV - Single Lumen 12/16/22 1402 18 G Posterior;Right Forearm 2 days         Peripheral IV - Single Lumen 12/16/22 2300 22 G Anterior;Right Forearm 2 days                    Physical Exam  Constitutional:       Appearance: Normal appearance.   HENT:      Nose:      Comments: No drainage      Mouth/Throat:      Pharynx: Oropharynx is clear.   Eyes:      Extraocular Movements: Extraocular movements intact.   Neurological:      Mental Status: He is alert.     Significant Labs:  CBC:   Recent Labs   Lab 12/19/22  0358   WBC 4.65   RBC 5.01   HGB 16.0   HCT 48.3      MCV 96   MCH 31.9*   MCHC 33.1     CMP:   Recent Labs   Lab 12/19/22  0358   GLU 98   CALCIUM 8.8   ALBUMIN 3.0*   PROT 6.3      K 3.9   CO2 27      BUN 12   CREATININE 0.8   ALKPHOS 83   ALT 55*   AST 28   BILITOT 0.6       Significant Diagnostics:  None

## 2022-12-19 NOTE — PROGRESS NOTES
[facility]  Otorhinolaryngology-Head & Neck Surgery  Progress Note    Subjective:     Post-Op Info:  Procedure(s) (LRB):  REPAIR, ETHMOID SINUS, ENDOSCOPIC, FOR CSF LEAK (Left)  FESS, USING COMPUTER-ASSISTED NAVIGATION (Bilateral)  LUMBAR PUNCTURE (N/A)   3 Days Post-Op  Hospital Day: 4     Interval History: NAEON. No drainage    Medications:  Continuous Infusions:  Scheduled Meds:   atorvastatin  80 mg Oral Daily    cefTRIAXone (ROCEPHIN) IVPB  2 g Intravenous Q24H    gabapentin  400 mg Oral TID    heparin (porcine)  5,000 Units Subcutaneous Q8H    levothyroxine  50 mcg Oral Before breakfast    methocarbamoL  750 mg Oral QID    mupirocin   Nasal BID    polyethylene glycol  17 g Oral Daily    sodium chloride  2 spray Each Nostril TID    tamsulosin  0.4 mg Oral Daily     PRN Meds:acetaminophen, labetalol, oxyCODONE, QUEtiapine, sodium chloride 0.9%     Review of patient's allergies indicates:  No Known Allergies  Objective:     Vital Signs (24h Range):  Temp:  [97.2 °F (36.2 °C)-98.1 °F (36.7 °C)] 97.2 °F (36.2 °C)  Pulse:  [57-88] 58  Resp:  [11-33] 11  SpO2:  [90 %-98 %] 97 %  BP: (113-157)/(60-92) 128/70       Lines/Drains/Airways       Drain  Duration                  Lumbar Drain 12/16/22 2000 2 days    Male External Urinary Catheter 12/16/22 2030 Medium 2 days              Peripheral Intravenous Line  Duration                  Peripheral IV - Single Lumen 12/16/22 1402 18 G Posterior;Right Forearm 2 days         Peripheral IV - Single Lumen 12/16/22 2300 22 G Anterior;Right Forearm 2 days                    Physical Exam  Constitutional:       Appearance: Normal appearance.   HENT:      Nose:      Comments: No drainage      Mouth/Throat:      Pharynx: Oropharynx is clear.   Eyes:      Extraocular Movements: Extraocular movements intact.   Neurological:      Mental Status: He is alert.     Significant Labs:  CBC:   Recent Labs   Lab 12/19/22  0358   WBC 4.65   RBC 5.01   HGB 16.0    HCT 48.3      MCV 96   MCH 31.9*   MCHC 33.1     CMP:   Recent Labs   Lab 12/19/22  0358   GLU 98   CALCIUM 8.8   ALBUMIN 3.0*   PROT 6.3      K 3.9   CO2 27      BUN 12   CREATININE 0.8   ALKPHOS 83   ALT 55*   AST 28   BILITOT 0.6       Significant Diagnostics:  None    Assessment/Plan:     * Postoperative CSF leak  67 y.o M with prior FESS and development of CSF leak, s/p lumbar drain, defect noted to be at L cribriform plate, s/p  middle turbinate flap on 12/16. Postop CTH appropriate.     - CSF leak precautions   - Sinonasal precautions; no nose blowing, sneeze with mouth open   - No straining, stool softeners   - HOB at 30 degrees   - Clamp LD this AM  - Initiated rocephin  - Saline spray TID             Lisa Cherry MD  Otorhinolaryngology-Head & Neck Surgery  [facility]

## 2022-12-19 NOTE — ASSESSMENT & PLAN NOTE
66 y/o m w/ pmhx of coronary artery atheroma, HTN, hypothyroidism, urinary retention, frequent sinus infections presents post op CSF leak repair. CSF leak after sinus surgery approximately 1 week prior to admission.   - Admit to [facility] for close monitoring in post op setting  - q1h neuro checks, vitals, I/Os   - NSGY and ENT following   - CTH (12/16) with expected post op changes   - Lumbar drain in place, draining 5cc/hr per ENT and NSGY   - SBP goals <160  - PRN labetalol, hydralazine  - HOB restriction to <30 degrees  -No post op imaging per ENT  -PT/INR, aPTT pending  -CBC, CMP, Mag, Phos daily   -PRN pain, nausea medication  -SLP  -PT/OT once HOB restrictions no longer required

## 2022-12-19 NOTE — PLAN OF CARE
[facility]  Initial Discharge Assessment       Primary Care Provider: Herbert Valles MD      Admission Diagnosis: Postoperative CSF leak [G97.82, G96.00]  CSF leak from nose [G96.01]    Admission Date: 12/16/2022  Expected Discharge Date: 12/26/2022    Discharge Barriers Identified: None    Payor: MEDICARE / Plan: MEDICARE PART A & B / Product Type: Government /     Extended Emergency Contact Information  Primary Emergency Contact: Fern Velasquez  Address: [address] [place] [address] [facility]  Home Phone: [phone]  Mobile Phone: [phone]  Relation: Spouse  Secondary Emergency Contact: Fern Velasquez  Mobile Phone: [phone]  Relation: Spouse   needed? No    Discharge Plan A: Home with family  Discharge Plan B: Home Health      [id number] - [place], [place] - [address]  [address]  [place] [address]  Phone: [phone] Fax: [phone]      Transferred from:  Home    Past Medical History:   Diagnosis Date    Essential (primary) hypertension     Other hyperlipidemia          CM met with patient and Fern Velasquez (wife) [phone] in room for Discharge Planning Assessment.  Patient is able to answer questions.  Per patient, he lives with his wife in a single story house with 3 step(s) to enter.   Per patient, he was independent with ADLS and used no dme for ambulation, but has a cane.  Patient will have assistance from his wife upon discharge.   Discharge Planning Booklet given to patient/family and discussed.  All questions addressed.  CM will follow for needs.    Initial Assessment (most recent)       Adult Discharge Assessment - 12/19/22 1514          Discharge Assessment    Assessment Type Discharge Planning Assessment     Confirmed/corrected address, phone number and insurance Yes     Confirmed Demographics Correct on Facesheet     Source of Information patient     Communicated LAXMI with patient/caregiver Date not available/Unable  to determine     Reason For Admission Postoperative CSF leak     People in Home spouse     Facility Arrived From: Home     Do you expect to return to your current living situation? Yes     Do you have help at home or someone to help you manage your care at home? Yes     Who are your caregiver(s) and their phone number(s)? Fern Velasquez (wife) [phone]     Prior to hospitilization cognitive status: Alert/Oriented     Current cognitive status: Alert/Oriented     Walking or Climbing Stairs --   independent    Dressing/Bathing --   independent    Home Accessibility stairs within home;stairs to enter home     Number of Stairs, Main Entrance three     Home Layout Able to live on 1st floor     Equipment Currently Used at Home cane, straight     Readmission within 30 days? No     Patient currently being followed by outpatient case management? No     Do you currently have service(s) that help you manage your care at home? No     Do you take prescription medications? Yes     Do you have prescription coverage? Yes     Coverage Medicare/[employer]     Do you have any problems affording any of your prescribed medications? No     Is the patient taking medications as prescribed? yes     Who is going to help you get home at discharge? Fern Velasquez (wife) [phone]     How do you get to doctors appointments? family or friend will provide     Are you on dialysis? No     Do you take coumadin? No     Discharge Plan A Home with family     Discharge Plan B Home Health     DME Needed Upon Discharge  none     Discharge Plan discussed with: Patient;Spouse/sig other     Name(s) and Number(s) Fern Velasquez (wife) [phone]     Discharge Barriers Identified None        OTHER    Name(s) of People in Home Fern Velasquez (wife) [phone]                      Zahira Pena RN, CCRN-K, [facility]  Neuro-Critical Care   [phone]

## 2022-12-19 NOTE — ASSESSMENT & PLAN NOTE
67 y.o M with prior FESS and development of CSF leak, s/p lumbar drain, defect noted to be at L cribriform plate, s/p  middle turbinate flap on 12/16. Postop CTH appropriate.     - CSF leak precautions   - Sinonasal precautions; no nose blowing, sneeze with mouth open   - No straining, stool softeners   - HOB at 30 degrees   - Clamp LD this AM  - Initiated rocephin  - Saline spray TID

## 2022-12-19 NOTE — PT/OT/SLP PROGRESS
Speech Language Pathology Treatment    Patient Name:  Sarabjit Velasquez   MRN:  [id number]  Admitting Diagnosis: Postoperative CSF leak    Recommendations:                 General Recommendations:  Follow-up not indicated  Diet recommendations:  Regular, Liquid Diet Level: Thin   Aspiration Precautions: Standard aspiration precautions   General Precautions: Standard,    Communication strategies:  none    Subjective     Pt awake and alert upright in bed     Pain/Comfort:  Pain Rating 1: 0/10  Pain Rating Post-Intervention 1: 0/10    Respiratory Status: Nasal cannula, flow 2 L/min    Objective:     Has the patient been evaluated by SLP for swallowing?   Yes  Keep patient NPO? No     Pt awake alert and pleasant. RN in agreement with SLP seeing at this time. Pt with AM meal tray at the bedside. Pt completed AM meal of regular solids with thin liquids in a timely fashion and without any overt oral feeding or swallowing deficits. Pt shortness of breath improved compared to prior therapy session. Pt reports he has been diligent about spirometry and feels it has helped with pt breathing. SLP discussed nectar thick liquids had been recommended as an option per pt preference. Pt hs been tolerating regular unthickened liquids and is in agreement with no longer needing additional diet modifications. SLP discussed pt to continue regular unrestricted diet without need for ongoing skilled speech therapy in the acute care setting.     Assessment:     Sarabjit Velasquez is a 67 y.o. male with an SLP diagnosis of Dysphagia.        Goals:   Multidisciplinary Problems       SLP Goals          Problem: SLP    Goal Priority Disciplines Outcome   SLP Goal     SLP Ongoing, Progressing   Description: Speech Language Pathology Goals  Goals expected to be met by 12/25    Pt will participate in ongoing assessment of swallow function to help determine least restrictive diet                          Plan:     Patient to be seen:  4 x/week   Plan  of Care expires:     Plan of Care reviewed with:      SLP Follow-Up:  No       Discharge recommendations:  home   Barriers to Discharge:  None    Time Tracking:     SLP Treatment Date:   12/19/22  Speech Start Time:  0922  Speech Stop Time:  0939     Speech Total Time (min):  17 min    Billable Minutes: Treatment Swallowing Dysfunction 8 and Self Care/Home Management Training 9    12/19/2022

## 2022-12-19 NOTE — PLAN OF CARE
[facility] Care Plan    POC reviewed with Sarabjit Recio Ron and family at 1400. Pt verbalized understanding. Questions and concerns addressed. No acute events today. Pt progressing toward goals. Will continue to monitor. See below and flowsheets for full assessment and VS info.     -Lumbar drain clamped   -Oxycodone given x1       Is this a stroke patient? no    Neuro:  [place] Coma Scale  Best Eye Response: 4-->(E4) spontaneous  Best Motor Response: 6-->(M6) obeys commands  Best Verbal Response: 5-->(V5) oriented  [place]berto Coma Scale Score: 15  Assessment Qualifiers: patient not sedated/intubated, no eye obstruction present  Pupil PERRLA: yes     24 hr Temp:  [97.2 °F (36.2 °C)-98 °F (36.7 °C)]     CV:   Rhythm: normal sinus rhythm  BP goals:   SBP < 180  MAP > 65    Resp:           Plan: N/A    GI/:     Diet/Nutrition Received: regular  Last Bowel Movement: 12/18/22  Voiding Characteristics: external catheter    Intake/Output Summary (Last 24 hours) at 12/19/2022 1616  Last data filed at 12/19/2022 1605  Gross per 24 hour   Intake 620.62 ml   Output 3690 ml   Net -3069.38 ml     Unmeasured Output  Stool Occurrence: 1    Labs/Accuchecks:  Recent Labs   Lab 12/19/22  0358   WBC 4.65   RBC 5.01   HGB 16.0   HCT 48.3         Recent Labs   Lab 12/19/22  0358      K 3.9   CO2 27      BUN 12   CREATININE 0.8   ALKPHOS 83   ALT 55*   AST 28   BILITOT 0.6      Recent Labs   Lab 12/16/22 2037   INR 1.1   APTT 24.3    No results for input(s): CPK, CPKMB, TROPONINI, MB in the last 168 hours.    Electrolytes: N/A - electrolytes WDL  Accuchecks: none    Gtts:      LDA/Wounds:  Lines/Drains/Airways       Drain  Duration                  Lumbar Drain 12/16/22 2000 2 days    Male External Urinary Catheter 12/16/22 2030 Medium 2 days              Peripheral Intravenous Line  Duration                  Peripheral IV - Single Lumen 12/16/22 1402 18 G Posterior;Right Forearm 3 days         Peripheral IV - Single Lumen  12/16/22 2300 22 G Anterior;Right Forearm 2 days                  Wounds: No  Wound care consulted: No

## 2022-12-19 NOTE — PROGRESS NOTES
[facility] - Neuro Critical Care  Neurocritical Care  Progress Note    Admit Date: 12/16/2022  Service Date: 12/18/2022  Length of Stay: 2    Subjective:     Chief Complaint: Postoperative CSF leak    History of Present Illness: Mr. Velasquez is a 68 y/o male w/ pmhx of coronary artery atheroma, HTN, hypothyroidism, urinary retention, frequent sinus infections and recent sinus surgery approximately 1 week who present post op from ENT/NeuroSurgery operation to intranasally repair residual CSF leak. Lumbar drain in place to drain at 5cc/hr. Admit to [facility] for close neurologic monitoring in post op period and while lumbar drain in place.       Hospital Course: 12/17/2022: NAEON. POD1 s/p ethmoid CSF fistula repair. CTH with expected post-op changes. LD draining 5cc/hr.   12/18/2022: NAEON. POD2 s/p ethmoid CSF fistula repair. LD draining 5cc/hr with tentative plans to clamp tomorrow. Pain medication regimen adjusted. SQH started.       Interval History:  See hospital course above.     Review of Systems   Constitutional:  Negative for chills, fatigue and fever.   HENT:  Positive for congestion (mild). Negative for facial swelling, nosebleeds, postnasal drip, rhinorrhea, sore throat and trouble swallowing.    Eyes:  Negative for photophobia and visual disturbance.   Respiratory:  Negative for shortness of breath.    Cardiovascular:  Negative for chest pain.   Gastrointestinal:  Negative for abdominal pain, nausea and vomiting.   Genitourinary:  Positive for difficulty urinating (history of retention). Negative for dysuria.   Musculoskeletal:  Negative for neck pain and neck stiffness.   Neurological:  Positive for headaches. Negative for facial asymmetry, speech difficulty and weakness.   Psychiatric/Behavioral:  Negative for agitation and confusion.      Objective:     Vitals:  Temp: 98.1 °F (36.7 °C)  Pulse: 77  Rhythm: normal sinus rhythm  BP: 122/66  MAP (mmHg): 86  Resp: (!) 22  SpO2: (!) 94 %    Temp  Min: 98 °F (36.7 °C)   Max: 98.9 °F (37.2 °C)  Pulse  Min: 63  Max: 91  BP  Min: 115/69  Max: 169/86  MAP (mmHg)  Min: 82  Max: 123  Resp  Min: 14  Max: 33  SpO2  Min: 90 %  Max: 98 %    12/17 0701 - 12/18 0700  In: 1115 [P.O.:300; I.V.:315]  Out: 4025 [Urine:3900; Drains:125]   Unmeasured Output  Stool Occurrence: 1       Physical Exam  Vitals and nursing note reviewed.   Constitutional:       General: He is not in acute distress.     Appearance: Normal appearance.      Comments: Well developed. Well nourished. Resting comfortably in bed.   HENT:      Head: Normocephalic and atraumatic.      Right Ear: External ear normal.      Left Ear: External ear normal.      Nose: Nose normal.      Mouth/Throat:      Mouth: Mucous membranes are moist.      Pharynx: Oropharynx is clear.   Eyes:      General: No scleral icterus.     Extraocular Movements: Extraocular movements intact.      Pupils: Pupils are equal, round, and reactive to light.   Cardiovascular:      Rate and Rhythm: Normal rate and regular rhythm.   Pulmonary:      Effort: Pulmonary effort is normal. No respiratory distress.   Abdominal:      General: Abdomen is flat. There is no distension.   Musculoskeletal:      Right lower leg: No edema.      Left lower leg: No edema.   Skin:     General: Skin is warm and dry.   Neurological:      Mental Status: He is alert.      Comments: E4V5M6  Awake, alert, & oriented x 4. Speech fluent. Follows commands briskly.   PERRL. EOMI. No facial asymmetry. Conversational hearing intact.   Moves all extremities spontaneously, symmetrically, and against gravity. Strength 5/5 & SILT in all 4 extremities.     Gait & coordination exams deferred.     Medications:  Continuous Scheduledatorvastatin, 80 mg, Daily  gabapentin, 400 mg, TID  heparin (porcine), 5,000 Units, Q8H  levothyroxine, 50 mcg, Before breakfast  methocarbamoL, 750 mg, QID  mupirocin, , BID  polyethylene glycol, 17 g, Daily  tamsulosin, 0.4 mg, Daily    PRNacetaminophen, 650 mg, Q6H  PRN  labetalol, 10 mg, Q6H PRN  oxyCODONE, 10 mg, Q6H PRN  QUEtiapine, 25 mg, Nightly PRN  sodium chloride 0.9%, 10 mL, PRN      Today I personally reviewed pertinent medications, lines/drains/airways, laboratory results, notably:    Laboratory:  CBC:  Recent Labs   Lab 12/18/22  0200   WBC 8.56   RBC 5.08   HGB 15.9   HCT 47.2      MCV 93   MCH 31.3*   MCHC 33.7       CMP:  Recent Labs   Lab 12/18/22  0200   CALCIUM 8.4*   PROT 6.2      K 4.0   CO2 21*      BUN 11   CREATININE 0.8   ALKPHOS 79   ALT 54*   AST 26   BILITOT 0.8           Diet  Diet Adult Regular (IDDSI Level 7)  Diet Adult Regular (IDDSI Level 7)    Assessment/Plan:     Neuro  * Postoperative CSF leak  66 y/o m w/ pmhx of coronary artery atheroma, HTN, hypothyroidism, urinary retention, frequent sinus infections presents post op CSF leak repair. CSF leak after sinus surgery approximately 1 week prior to admission.   - Admit to [facility] for close monitoring in post op setting  - q1h neuro checks, vitals, I/Os   - NSGY and ENT following   - CTH (12/16) with expected post op changes   - Lumbar drain in place, draining 5cc/hr per ENT and NSGY   - SBP goals <160  - PRN labetalol, hydralazine  - HOB restriction to <30 degrees  -No post op imaging per ENT  -PT/INR, aPTT pending  -CBC, CMP, Mag, Phos daily   -PRN pain, nausea medication  -SLP  -PT/OT once HOB restrictions no longer required     Cardiac/Vascular  Primary hypertension  History of  - EKG, echo  - SBP goal <160   - PRN labetalol, hydralazine      Coronary artery atheroma  History of,   -Holding daily ASA in initial post op period   -EKG and Echo     Renal/  Urinary retention  History of   - Bladder scan q6h, PRN straight cath   - Continue home flomax    Endocrine  Other specified hypothyroidism  History of,   - TSH, FT4 sent  - Continue home synthroid           The patient is being Prophylaxed for:  Venous Thromboembolism with: Mechanical or Chemical  Stress Ulcer with: Not  Applicable   Ventilator Pneumonia with: not applicable    Activity Orders          Diet Adult Regular (IDDSI Level 7): Regular starting at 12/17 0848    Turn patient starting at 12/16 2200    Elevate HOB starting at 12/16 2017        Full Code    Level III    Lala Mukherjee, PA-C  Neurocritical Care  [facility] - Neuro Critical Care

## 2022-12-19 NOTE — SUBJECTIVE & OBJECTIVE
Interval History: 12/19: NAEON. AFVSS. Exam stable. LD functional. Plan for clamp today and pull tomorrow.     Medications:  Continuous Infusions:  Scheduled Meds:   atorvastatin  80 mg Oral Daily    cefTRIAXone (ROCEPHIN) IVPB  2 g Intravenous Q24H    gabapentin  400 mg Oral TID    heparin (porcine)  5,000 Units Subcutaneous Q8H    levothyroxine  50 mcg Oral Before breakfast    methocarbamoL  750 mg Oral QID    polyethylene glycol  17 g Oral Daily    senna-docusate 8.6-50 mg  1 tablet Oral BID    sodium chloride  2 spray Each Nostril TID    tamsulosin  0.4 mg Oral Daily     PRN Meds:acetaminophen, labetalol, oxyCODONE, QUEtiapine, sodium chloride 0.9%     Review of Systems  Objective:     Weight: 88.9 kg (196 lb)  Body mass index is 28.94 kg/m².  Vital Signs (Most Recent):  Temp: 97.7 °F (36.5 °C) (12/19/22 0705)  Pulse: 71 (12/19/22 0905)  Resp: (!) 21 (12/19/22 0905)  BP: (!) 160/91 (12/19/22 0905)  SpO2: 96 % (12/19/22 0905)   Vital Signs (24h Range):  Temp:  [97.2 °F (36.2 °C)-98.1 °F (36.7 °C)] 97.7 °F (36.5 °C)  Pulse:  [55-84] 71  Resp:  [11-32] 21  SpO2:  [93 %-98 %] 96 %  BP: (113-160)/(60-92) 160/91     Date 12/19/22 0700 - 12/20/22 0659   Shift 9005-2392 3940-7594 1218-9809 24 Hour Total   INTAKE   P.O. 250   250   IV Piggyback 50.6   50.6   Shift Total(mL/kg) 300.6(3.4)   300.6(3.4)   OUTPUT   Drains 5   5   Shift Total(mL/kg) 5(0.1)   5(0.1)   Weight (kg) 88.9 88.9 88.9 88.9                   Male External Urinary Catheter 12/16/22 2030 Medium (Active)   Collection Container Urimeter 12/18/22 0306   Securement Method secured to top of thigh w/ leg strap 12/18/22 0305   Skin no breakdown;no redness 12/18/22 0305   Tolerance no signs/symptoms of discomfort 12/18/22 0305   Output (mL) 475 mL 12/18/22 0605   Catheter Change Date 12/17/22 12/18/22 0305   Catheter Change Time 2030 12/17/22 0305            Lumbar Drain 12/16/22 2000 (Active)   Drain Status Clamped 12/18/22 0305   Drain Level (cm) 10 cm  12/17/22 1905   Level to other (see comment) 12/18/22 0305   CSF Color Other (Comment) 12/18/22 0305   Site Description Healing 12/18/22 0305   Dressing Status Intact 12/18/22 0305   Interventions HOB degrees (see comment);Bed controls locked 12/18/22 0305   Output (mL) 5 mL 12/18/22 0605       Physical Exam    Neurosurgery Physical Exam  E4V5M6  Aox3  Cni, PERRL  Fcx4 AG  SILT     No drift  LD site c/d/I with tegaderms securing  Significant Labs:  Recent Labs   Lab 12/18/22  0200 12/19/22  0358   GLU 90 98    141   K 4.0 3.9    105   CO2 21* 27   BUN 11 12   CREATININE 0.8 0.8   CALCIUM 8.4* 8.8   MG 2.2 2.4       Recent Labs   Lab 12/18/22  0200 12/19/22  0358   WBC 8.56 4.65   HGB 15.9 16.0   HCT 47.2 48.3    177       No results for input(s): LABPT, INR, APTT in the last 48 hours.    Microbiology Results (last 7 days)       ** No results found for the last 168 hours. **          All pertinent labs from the last 24 hours have been reviewed.    Significant Diagnostics:  I have reviewed and interpreted all pertinent imaging results/findings within the past 24 hours.  No results found in the last 24 hours.

## 2022-12-19 NOTE — HPI
Sarabjit Velasquez is a 67 y.o. male who presents for evaluation of a possible CSF leak, which has been present for about 1 week. He reports having revision ESS on 12/8/22 with Dr. Thomson who noted some thinning of the skull base during surgery. Patient reports that he put some mesh over the defect. Since surgery has been having mostly left sided nasal drainage, clear in nature. Causing him to cough significantly keeping him up at night. Reports some head aches and low grade fevers (around 100F). He denies any changes in his mental status, no neck stiffness, no photophobia or nausea/vomiting. He was started on Cefuroxime by Dr. Thomson. He reports having previous ESS x 2, one in 2016, and the most recent one last week. Was told there was some fungus within one of his sinuses.

## 2022-12-19 NOTE — PROGRESS NOTES
[facility] - Neuro Critical Care  Neurocritical Care  Progress Note    Admit Date: 12/16/2022  Service Date: 12/19/2022  Length of Stay: 3    Subjective:     Chief Complaint: Postoperative CSF leak    History of Present Illness: Mr. Velasquez is a 66 y/o male w/ pmhx of coronary artery atheroma, HTN, hypothyroidism, urinary retention, frequent sinus infections and recent sinus surgery approximately 1 week who present post op from ENT/NeuroSurgery operation to intranasally repair residual CSF leak. Lumbar drain in place to drain at 5cc/hr. Admit to [facility] for close neurologic monitoring in post op period and while lumbar drain in place.       Hospital Course: 12/17/2022: NAEON. POD1 s/p ethmoid CSF fistula repair. CTH with expected post-op changes. LD draining 5cc/hr.   12/18/2022: NAEON. POD2 s/p ethmoid CSF fistula repair. LD draining 5cc/hr with tentative plans to clamp tomorrow. Pain medication regimen adjusted. SQH started.   12/19: NAEON, lumbar drain clamped, add senna doc      Review of Symptoms:   Constitutional: Denies fevers or chills.  ENT: no hearing difficulty, no visual changes  Pulmonary: Denies shortness of breath or cough.  Cardiology: Denies chest pain or palpitations.  GI: Denies abdominal pain or constipation.  Neurologic: Denies new weakness, headaches, or paresthesias.   : no dysuria  Musk: no muscle pain, no joint pain  Psych: no hallucinations    Physical Exam:  GA: Alert, comfortable, no acute distress.   HEENT: No scleral icterus or JVD. Hearing aids in place  Pulmonary: Clear to auscultation A/L. No wheezing, crackles, or rhonchi.  Cardiac: RRR S1 & S2 w/o rubs/murmurs/gallops.   Abdominal: Bowel sounds present x 4. No appreciable hepatosplenomegaly.  Skin: No jaundice, rashes, or visible lesions.LD in place  Pulses:2+ Dp bilat    Neuro:  --sedation: none  --GCS: 15  --Mental Status: aox3    --CN II-XII grossly intact.   --Pupils brisk bilat   --brainstem: intact  --Motor: 5/5  throughout  --sensory: intact to soft touch and pain throughout  --Reflexes: not tested  --Gait: deferred      Recent Labs   Lab 12/19/22  0358   WBC 4.65   RBC 5.01   HGB 16.0   HCT 48.3      MCV 96   MCH 31.9*   MCHC 33.1     Recent Labs   Lab 12/19/22  0358   CALCIUM 8.8   PROT 6.3      K 3.9   CO2 27      BUN 12   CREATININE 0.8   ALKPHOS 83   ALT 55*   AST 28   BILITOT 0.6     No results for input(s): PT, INR, APTT in the last 24 hours.       Temp: 97.7 °F (36.5 °C)  Pulse: 73  Rhythm: sinus bradycardia  BP: 131/76  MAP (mmHg): 98  Resp: 18  SpO2: 95 %    Temp  Min: 97.2 °F (36.2 °C)  Max: 98.1 °F (36.7 °C)  Pulse  Min: 55  Max: 84  BP  Min: 113/69  Max: 160/91  MAP (mmHg)  Min: 79  Max: 119  Resp  Min: 11  Max: 32  SpO2  Min: 93 %  Max: 98 %    12/18 0701 - 12/19 0700  In: 870 [P.O.:800]  Out: 3015 [Urine:2900; Drains:115]   Unmeasured Output  Stool Occurrence: 1    Nutrition Prescription Ordered  Current Diet Order: Regular  Last Bowel Movement: 12/18/22    Body mass index is 28.94 kg/m².      I have personally reviewed all labs, imaging, and studies today    Scheduled Meds:   atorvastatin  80 mg Oral Daily    cefTRIAXone (ROCEPHIN) IVPB  2 g Intravenous Q24H    gabapentin  400 mg Oral TID    heparin (porcine)  5,000 Units Subcutaneous Q8H    levothyroxine  50 mcg Oral Before breakfast    methocarbamoL  750 mg Oral QID    polyethylene glycol  17 g Oral Daily    senna-docusate 8.6-50 mg  1 tablet Oral BID    sodium chloride  2 spray Each Nostril TID    tamsulosin  0.4 mg Oral Daily     Continuous Infusions:  PRN Meds:.acetaminophen, labetalol, oxyCODONE, QUEtiapine, sodium chloride 0.9%      Assessment/Plan:     Neuro  * Postoperative CSF leak  66 y/o m w/ pmhx of coronary artery atheroma, HTN, hypothyroidism, urinary retention, frequent sinus infections presents post op CSF leak repair. CSF leak after sinus surgery approximately 1 week prior to admission.     - q1h neuro checks,  vitals, I/Os   - NSGY and ENT following   - CTH (12/16) with expected post op changes   - Lumbar drain clamped  - SBP goals <160  - PRN labetalol, hydralazine  - HOB restriction to <30 degrees  -No post op imaging per ENT  -PT/INR, aPTT pending  -CBC, CMP, Mag, Phos daily   -PRN pain, nausea medication  -SLP  -PT/OT once HOB restrictions no longer required     Cardiac/Vascular  Primary hypertension    - EKG, echo  - SBP goal <160   - PRN labetalol, hydralazine      Renal/  Urinary retention    - Bladder scan q6h, PRN straight cath   - Continue home flomax    Endocrine  Acquired hypothyroidism    - Continue home synthroid           The patient is being Prophylaxed for:  Venous Thromboembolism with: Chemical  Stress Ulcer with: Not Applicable   Ventilator Pneumonia with: not applicable    Activity Orders          Diet Adult Regular (IDDSI Level 7): Regular starting at 12/17 0848    Turn patient starting at 12/16 2200    Elevate HOB starting at 12/16 2017        Full Code    Viral Archer MD  Neurocritical Care  [facility] - Neuro Critical Care    Level III

## 2022-12-19 NOTE — OP NOTE
DATE: 12/16/22    PREOP DIAGNOSIS:  CSF rhinorrhea    POSTOP DIAGNOSIS:  Same as above    OPERATION PERFORMED:  Lumbar drain placement  Injection of intrathecal flouroscein    SURGEON:  Mariano Morgan D.O.    ASSISTANT:  Phani Stoddard M.D.    LEVEL OF INVOLVEMENT OF ATTENDING:  Full    INDICATION:  67-year-old male with a history of multiple endoscopic sinus surgeries. He recently underwent endoscopic sinus surgery about 1 week ago. Since his surgery he has been having clear nasal drainage from the left nares. He was evaluated in clinic where the fluid was sampled for B2-transferrin. He was advised to undergo endoscopic closure of his CSF leak. The morning of surgery his B2 came back positive.     Consents were obtained and risks, benefits, and alternatives to surgery were discussed.    PROCEDURE IN DETAIL:  The patient was placed in the lateral decubitus position with the legs flexed towards the chest.  The lumbar region was prepped and draped in typical sterile fashion.  The L4-5 interspinous space was selected using iliac crest as the landmark.  Next the lumbar drain Touhy needle was inserted between L4-5 in the interspinous space until the ligamentum flavum was punctured and then ultimately the thecal sac.  It was at this point that the inner stylet of the Tuohy needle was removed and clear CSF was returned.  Opening pressure observed to be 15mm Hg. Lumbar drain was threaded to 7cm without resistance. 10cm CSF aspirated and mixed with 0.1cc fluoroscein and slowly administered through lumbar drain. Drainage catheter then connected to external CSF collection system and clamped. Patient tolerated procedure well. Turned over to care of ENT/Anesthesia.     COMPLICATIONS: none    INCISION: lumbar    WOUND CLASS: clean    FINDINGS: clear CSF    DRAIN: Lumbar drain     CONDITION: Stable    PROGNOSIS: Good

## 2022-12-20 LAB
ALBUMIN SERPL BCP-MCNC: 3.1 G/DL (ref 3.5–5.2)
ALP SERPL-CCNC: 90 U/L (ref 55–135)
ALT SERPL W/O P-5'-P-CCNC: 72 U/L (ref 10–44)
ANION GAP SERPL CALC-SCNC: 11 MMOL/L (ref 8–16)
AST SERPL-CCNC: 44 U/L (ref 10–40)
BASOPHILS # BLD AUTO: 0.03 K/UL (ref 0–0.2)
BASOPHILS NFR BLD: 0.4 % (ref 0–1.9)
BILIRUB SERPL-MCNC: 0.5 MG/DL (ref 0.1–1)
BUN SERPL-MCNC: 14 MG/DL (ref 8–23)
CALCIUM SERPL-MCNC: 8.9 MG/DL (ref 8.7–10.5)
CHLORIDE SERPL-SCNC: 104 MMOL/L (ref 95–110)
CO2 SERPL-SCNC: 25 MMOL/L (ref 23–29)
CREAT SERPL-MCNC: 0.9 MG/DL (ref 0.5–1.4)
DIFFERENTIAL METHOD: ABNORMAL
EOSINOPHIL # BLD AUTO: 0.3 K/UL (ref 0–0.5)
EOSINOPHIL NFR BLD: 3.5 % (ref 0–8)
ERYTHROCYTE [DISTWIDTH] IN BLOOD BY AUTOMATED COUNT: 14.4 % (ref 11.5–14.5)
EST. GFR  (NO RACE VARIABLE): >60 ML/MIN/1.73 M^2
GLUCOSE SERPL-MCNC: 90 MG/DL (ref 70–110)
HCT VFR BLD AUTO: 48.4 % (ref 40–54)
HGB BLD-MCNC: 16.4 G/DL (ref 14–18)
IMM GRANULOCYTES # BLD AUTO: 0.06 K/UL (ref 0–0.04)
IMM GRANULOCYTES NFR BLD AUTO: 0.9 % (ref 0–0.5)
LYMPHOCYTES # BLD AUTO: 1.4 K/UL (ref 1–4.8)
LYMPHOCYTES NFR BLD: 19.7 % (ref 18–48)
MAGNESIUM SERPL-MCNC: 2.1 MG/DL (ref 1.6–2.6)
MCH RBC QN AUTO: 32.2 PG (ref 27–31)
MCHC RBC AUTO-ENTMCNC: 33.9 G/DL (ref 32–36)
MCV RBC AUTO: 95 FL (ref 82–98)
MONOCYTES # BLD AUTO: 0.5 K/UL (ref 0.3–1)
MONOCYTES NFR BLD: 7.2 % (ref 4–15)
NEUTROPHILS # BLD AUTO: 4.8 K/UL (ref 1.8–7.7)
NEUTROPHILS NFR BLD: 68.3 % (ref 38–73)
NRBC BLD-RTO: 0 /100 WBC
PHOSPHATE SERPL-MCNC: 4.4 MG/DL (ref 2.7–4.5)
PLATELET # BLD AUTO: 193 K/UL (ref 150–450)
PMV BLD AUTO: 9.8 FL (ref 9.2–12.9)
POTASSIUM SERPL-SCNC: 4.2 MMOL/L (ref 3.5–5.1)
PROT SERPL-MCNC: 6.4 G/DL (ref 6–8.4)
RBC # BLD AUTO: 5.09 M/UL (ref 4.6–6.2)
SODIUM SERPL-SCNC: 140 MMOL/L (ref 136–145)
WBC # BLD AUTO: 7.05 K/UL (ref 3.9–12.7)

## 2022-12-20 PROCEDURE — 25000003 PHARM REV CODE 250

## 2022-12-20 PROCEDURE — 63600175 PHARM REV CODE 636 W HCPCS: Performed by: STUDENT IN AN ORGANIZED HEALTH CARE EDUCATION/TRAINING PROGRAM

## 2022-12-20 PROCEDURE — 84100 ASSAY OF PHOSPHORUS: CPT

## 2022-12-20 PROCEDURE — 63600175 PHARM REV CODE 636 W HCPCS

## 2022-12-20 PROCEDURE — 99233 SBSQ HOSP IP/OBS HIGH 50: CPT | Mod: 95,,, | Performed by: PSYCHIATRY & NEUROLOGY

## 2022-12-20 PROCEDURE — 80053 COMPREHEN METABOLIC PANEL: CPT

## 2022-12-20 PROCEDURE — 25000003 PHARM REV CODE 250: Performed by: STUDENT IN AN ORGANIZED HEALTH CARE EDUCATION/TRAINING PROGRAM

## 2022-12-20 PROCEDURE — 27000221 HC OXYGEN, UP TO 24 HOURS

## 2022-12-20 PROCEDURE — 94761 N-INVAS EAR/PLS OXIMETRY MLT: CPT

## 2022-12-20 PROCEDURE — 11000001 HC ACUTE MED/SURG PRIVATE ROOM

## 2022-12-20 PROCEDURE — 83735 ASSAY OF MAGNESIUM: CPT

## 2022-12-20 PROCEDURE — 99900035 HC TECH TIME PER 15 MIN (STAT)

## 2022-12-20 PROCEDURE — 25000003 PHARM REV CODE 250: Performed by: PSYCHIATRY & NEUROLOGY

## 2022-12-20 PROCEDURE — 99233 PR SUBSEQUENT HOSPITAL CARE,LEVL III: ICD-10-PCS | Mod: 95,,, | Performed by: PSYCHIATRY & NEUROLOGY

## 2022-12-20 PROCEDURE — 85025 COMPLETE CBC W/AUTO DIFF WBC: CPT

## 2022-12-20 RX ORDER — ZOLPIDEM TARTRATE 5 MG/1
5 TABLET ORAL NIGHTLY PRN
Status: DISCONTINUED | OUTPATIENT
Start: 2022-12-20 | End: 2022-12-21 | Stop reason: HOSPADM

## 2022-12-20 RX ORDER — ACETAMINOPHEN 325 MG/1
650 TABLET ORAL EVERY 8 HOURS PRN
Status: DISCONTINUED | OUTPATIENT
Start: 2022-12-20 | End: 2022-12-20

## 2022-12-20 RX ORDER — PROCHLORPERAZINE EDISYLATE 5 MG/ML
5 INJECTION INTRAMUSCULAR; INTRAVENOUS EVERY 6 HOURS PRN
Status: DISCONTINUED | OUTPATIENT
Start: 2022-12-20 | End: 2022-12-21 | Stop reason: HOSPADM

## 2022-12-20 RX ORDER — LIDOCAINE HYDROCHLORIDE 10 MG/ML
1 INJECTION, SOLUTION EPIDURAL; INFILTRATION; INTRACAUDAL; PERINEURAL ONCE AS NEEDED
Status: DISCONTINUED | OUTPATIENT
Start: 2022-12-20 | End: 2022-12-21 | Stop reason: HOSPADM

## 2022-12-20 RX ORDER — ACETAMINOPHEN 325 MG/1
650 TABLET ORAL EVERY 4 HOURS PRN
Status: DISCONTINUED | OUTPATIENT
Start: 2022-12-20 | End: 2022-12-20

## 2022-12-20 RX ORDER — SODIUM CHLORIDE 0.9 % (FLUSH) 0.9 %
10 SYRINGE (ML) INJECTION
Status: DISCONTINUED | OUTPATIENT
Start: 2022-12-20 | End: 2022-12-21 | Stop reason: HOSPADM

## 2022-12-20 RX ORDER — QUETIAPINE FUMARATE 25 MG/1
25 TABLET, FILM COATED ORAL NIGHTLY
Status: DISCONTINUED | OUTPATIENT
Start: 2022-12-20 | End: 2022-12-21 | Stop reason: HOSPADM

## 2022-12-20 RX ORDER — GABAPENTIN 300 MG/1
300 CAPSULE ORAL 3 TIMES DAILY PRN
Status: DISCONTINUED | OUTPATIENT
Start: 2022-12-20 | End: 2022-12-21 | Stop reason: HOSPADM

## 2022-12-20 RX ORDER — LOSARTAN POTASSIUM 25 MG/1
25 TABLET ORAL DAILY
Status: DISCONTINUED | OUTPATIENT
Start: 2022-12-21 | End: 2022-12-21 | Stop reason: HOSPADM

## 2022-12-20 RX ORDER — POLYETHYLENE GLYCOL 3350 17 G/17G
17 POWDER, FOR SOLUTION ORAL DAILY
Status: DISCONTINUED | OUTPATIENT
Start: 2022-12-21 | End: 2022-12-20

## 2022-12-20 RX ORDER — TAMSULOSIN HYDROCHLORIDE 0.4 MG/1
0.4 CAPSULE ORAL EVERY MORNING
Status: DISCONTINUED | OUTPATIENT
Start: 2022-12-21 | End: 2022-12-20

## 2022-12-20 RX ORDER — TALC
6 POWDER (GRAM) TOPICAL NIGHTLY PRN
Status: DISCONTINUED | OUTPATIENT
Start: 2022-12-20 | End: 2022-12-21 | Stop reason: HOSPADM

## 2022-12-20 RX ORDER — LEVOTHYROXINE SODIUM 50 UG/1
50 TABLET ORAL
Status: DISCONTINUED | OUTPATIENT
Start: 2022-12-21 | End: 2022-12-20

## 2022-12-20 RX ORDER — TIZANIDINE 2 MG/1
2 TABLET ORAL EVERY 8 HOURS PRN
Status: DISCONTINUED | OUTPATIENT
Start: 2022-12-20 | End: 2022-12-21 | Stop reason: HOSPADM

## 2022-12-20 RX ORDER — ASPIRIN 81 MG/1
81 TABLET ORAL ONCE
Status: COMPLETED | OUTPATIENT
Start: 2022-12-20 | End: 2022-12-20

## 2022-12-20 RX ORDER — TADALAFIL 5 MG/1
5 TABLET ORAL DAILY
Status: DISCONTINUED | OUTPATIENT
Start: 2022-12-21 | End: 2022-12-21 | Stop reason: HOSPADM

## 2022-12-20 RX ORDER — FINASTERIDE 5 MG/1
5 TABLET, FILM COATED ORAL EVERY MORNING
Status: DISCONTINUED | OUTPATIENT
Start: 2022-12-21 | End: 2022-12-21 | Stop reason: HOSPADM

## 2022-12-20 RX ORDER — AMOXICILLIN 250 MG
1 CAPSULE ORAL DAILY
Status: DISCONTINUED | OUTPATIENT
Start: 2022-12-21 | End: 2022-12-20

## 2022-12-20 RX ORDER — ONDANSETRON 2 MG/ML
4 INJECTION INTRAMUSCULAR; INTRAVENOUS EVERY 6 HOURS PRN
Status: DISCONTINUED | OUTPATIENT
Start: 2022-12-20 | End: 2022-12-21 | Stop reason: HOSPADM

## 2022-12-20 RX ORDER — LIDOCAINE HYDROCHLORIDE 10 MG/ML
10 INJECTION INFILTRATION; PERINEURAL ONCE
Status: COMPLETED | OUTPATIENT
Start: 2022-12-20 | End: 2022-12-20

## 2022-12-20 RX ORDER — OXYCODONE HYDROCHLORIDE 5 MG/1
5 TABLET ORAL EVERY 4 HOURS PRN
Status: DISCONTINUED | OUTPATIENT
Start: 2022-12-20 | End: 2022-12-21 | Stop reason: HOSPADM

## 2022-12-20 RX ADMIN — LEVOTHYROXINE SODIUM 50 MCG: 50 TABLET ORAL at 05:12

## 2022-12-20 RX ADMIN — Medication 2 SPRAY: at 04:12

## 2022-12-20 RX ADMIN — POLYETHYLENE GLYCOL 3350 17 G: 17 POWDER, FOR SOLUTION ORAL at 09:12

## 2022-12-20 RX ADMIN — OXYCODONE HYDROCHLORIDE 10 MG: 10 TABLET ORAL at 02:12

## 2022-12-20 RX ADMIN — GABAPENTIN 400 MG: 400 CAPSULE ORAL at 04:12

## 2022-12-20 RX ADMIN — Medication 2 SPRAY: at 09:12

## 2022-12-20 RX ADMIN — METHOCARBAMOL 750 MG: 750 TABLET ORAL at 04:12

## 2022-12-20 RX ADMIN — SENNOSIDES AND DOCUSATE SODIUM 1 TABLET: 50; 8.6 TABLET ORAL at 09:12

## 2022-12-20 RX ADMIN — OXYCODONE HYDROCHLORIDE 10 MG: 10 TABLET ORAL at 09:12

## 2022-12-20 RX ADMIN — HEPARIN SODIUM 5000 UNITS: 5000 INJECTION INTRAVENOUS; SUBCUTANEOUS at 05:12

## 2022-12-20 RX ADMIN — ACETAMINOPHEN 650 MG: 325 TABLET ORAL at 09:12

## 2022-12-20 RX ADMIN — HEPARIN SODIUM 5000 UNITS: 5000 INJECTION INTRAVENOUS; SUBCUTANEOUS at 04:12

## 2022-12-20 RX ADMIN — GABAPENTIN 400 MG: 400 CAPSULE ORAL at 09:12

## 2022-12-20 RX ADMIN — CEFTRIAXONE 2 G: 2 INJECTION, POWDER, FOR SOLUTION INTRAMUSCULAR; INTRAVENOUS at 09:12

## 2022-12-20 RX ADMIN — METHOCARBAMOL 750 MG: 750 TABLET ORAL at 09:12

## 2022-12-20 RX ADMIN — HEPARIN SODIUM 5000 UNITS: 5000 INJECTION INTRAVENOUS; SUBCUTANEOUS at 09:12

## 2022-12-20 RX ADMIN — ASPIRIN 81 MG: 81 TABLET, COATED ORAL at 06:12

## 2022-12-20 RX ADMIN — QUETIAPINE FUMARATE 25 MG: 25 TABLET ORAL at 09:12

## 2022-12-20 RX ADMIN — ACETAMINOPHEN 650 MG: 325 TABLET ORAL at 05:12

## 2022-12-20 RX ADMIN — ATORVASTATIN CALCIUM 80 MG: 40 TABLET, FILM COATED ORAL at 09:12

## 2022-12-20 RX ADMIN — METHOCARBAMOL 750 MG: 750 TABLET ORAL at 12:12

## 2022-12-20 RX ADMIN — CEFTRIAXONE 2 G: 2 INJECTION, POWDER, FOR SOLUTION INTRAMUSCULAR; INTRAVENOUS at 11:12

## 2022-12-20 RX ADMIN — LIDOCAINE HYDROCHLORIDE 10 ML: 10 INJECTION, SOLUTION INFILTRATION; PERINEURAL at 01:12

## 2022-12-20 RX ADMIN — TAMSULOSIN HYDROCHLORIDE 0.4 MG: 0.4 CAPSULE ORAL at 09:12

## 2022-12-20 NOTE — ASSESSMENT & PLAN NOTE
67M with LD placed on 12/16/22 for ethmoidal CSF fistual repair with ENT    Plan:   --icu, OK for step down to ENT today   --q1 nc/vs icu, q2 stepdown, q4 floor  --all labs and significant diagnostics reviewed  --LD removed tomorrow  --please notfiy nsgy of any acute neurological decline    Dispo: stepdown to ENT

## 2022-12-20 NOTE — PROGRESS NOTES
[facility] - Neuro Critical Care  Neurocritical Care  Progress Note    Admit Date: 12/16/2022  Service Date: 12/20/2022  Length of Stay: 4    Subjective:     Chief Complaint: Postoperative CSF leak    History of Present Illness: Mr. Velasquez is a 66 y/o male w/ pmhx of coronary artery atheroma, HTN, hypothyroidism, urinary retention, frequent sinus infections and recent sinus surgery approximately 1 week who present post op from ENT/NeuroSurgery operation to intranasally repair residual CSF leak. Lumbar drain in place to drain at 5cc/hr. Admit to [facility] for close neurologic monitoring in post op period and while lumbar drain in place.       Hospital Course: 12/17/2022: NAEON. POD1 s/p ethmoid CSF fistula repair. CTH with expected post-op changes. LD draining 5cc/hr.   12/18/2022: NAEON. POD2 s/p ethmoid CSF fistula repair. LD draining 5cc/hr with tentative plans to clamp tomorrow. Pain medication regimen adjusted. SQH started.   12/19/2022 NAEON, lumbar drain clamped, add senna doc.   12/20: drain to be pulled today, then ambulate, the TTF    Review of Symptoms:   Constitutional: Denies fevers or chills.  ENT: no hearing difficulty, no visual changes  Pulmonary: Denies shortness of breath or cough.  Cardiology: Denies chest pain or palpitations.  GI: Denies abdominal pain or constipation.  Neurologic: Denies new weakness, headaches, or paresthesias.   : no dysuria  Musk: no muscle pain, no joint pain  Psych: no hallucinations     Physical Exam:  GA: Alert, comfortable, no acute distress.   HEENT: No scleral icterus or JVD. Hearing aids in place  Pulmonary: Clear to auscultation A/L. No wheezing, crackles, or rhonchi.  Cardiac: RRR S1 & S2 w/o rubs/murmurs/gallops.   Abdominal: Bowel sounds present x 4. No appreciable hepatosplenomegaly.  Skin: No jaundice, rashes, or visible lesions.LD in place  Pulses:2+ Dp bilat     Neuro:  --sedation: none  --GCS: 15  --Mental Status: aox3    --CN II-XII grossly intact.   --Pupils brisk  bilat   --brainstem: intact  --Motor: 5/5 throughout  --sensory: intact to soft touch and pain throughout  --Reflexes: not tested  --Gait: deferred    Recent Labs   Lab 12/20/22  0308   WBC 7.05   RBC 5.09   HGB 16.4   HCT 48.4      MCV 95   MCH 32.2*   MCHC 33.9     Recent Labs   Lab 12/20/22  0308   CALCIUM 8.9   PROT 6.4      K 4.2   CO2 25      BUN 14   CREATININE 0.9   ALKPHOS 90   ALT 72*   AST 44*   BILITOT 0.5     No results for input(s): PT, INR, APTT in the last 24 hours.       Temp: 97.9 °F (36.6 °C)  Pulse: 73  Rhythm: normal sinus rhythm  BP: (!) 153/79  MAP (mmHg): 107  Resp: (!) 23  SpO2: (!) 92 %    Temp  Min: 97.7 °F (36.5 °C)  Max: 98 °F (36.7 °C)  Pulse  Min: 59  Max: 94  BP  Min: 117/70  Max: 160/81  MAP (mmHg)  Min: 88  Max: 114  Resp  Min: 12  Max: 28  SpO2  Min: 91 %  Max: 97 %    12/19 0701 - 12/20 0700  In: 980.6 [P.O.:930]  Out: 3220 [Urine:3200; Drains:20]   Unmeasured Output  Stool Occurrence: 1    Nutrition Prescription Ordered  Current Diet Order: Regular  Last Bowel Movement: 12/20/22    Body mass index is 28.94 kg/m².      I have personally reviewed all labs, imaging, and studies today    Scheduled Meds:   atorvastatin  80 mg Oral Daily    cefTRIAXone (ROCEPHIN) IVPB  2 g Intravenous Q24H    gabapentin  400 mg Oral TID    heparin (porcine)  5,000 Units Subcutaneous Q8H    levothyroxine  50 mcg Oral Before breakfast    methocarbamoL  750 mg Oral QID    polyethylene glycol  17 g Oral Daily    senna-docusate 8.6-50 mg  1 tablet Oral BID    sodium chloride  2 spray Each Nostril TID    tamsulosin  0.4 mg Oral Daily     Continuous Infusions:  PRN Meds:.acetaminophen, labetalol, oxyCODONE, QUEtiapine, sodium chloride 0.9%lab      Assessment/Plan:     Neuro  * Postoperative CSF leak  66 y/o m w/ pmhx of coronary artery atheroma, HTN, hypothyroidism, urinary retention, frequent sinus infections presents post op CSF leak repair. CSF leak after sinus surgery  approximately 1 week prior to admission.     - q1h neuro checks, vitals, I/Os   - NSGY and ENT following   - CTH (12/16) with expected post op changes   - Lumbar drain clamped  - SBP goals <160  - PRN labetalol, hydralazine  - HOB restriction to <30 degrees  -No post op imaging per ENT  -PT/INR, aPTT pending  -CBC, CMP, Mag, Phos daily   -PRN pain, nausea medication  -SLP  -PT/OT once HOB restrictions no longer required     Cardiac/Vascular  Primary hypertension    - EKG, echo  - SBP goal <160   - PRN labetalol, hydralazine      Renal/  Urinary retention    - Bladder scan q6h, PRN straight cath   - Continue home flomax    Endocrine  Acquired hypothyroidism    - Continue home synthroid           The patient is being Prophylaxed for:  Venous Thromboembolism with: Chemical  Stress Ulcer with: Not Applicable   Ventilator Pneumonia with: not applicable    Activity Orders          Diet Adult Regular (IDDSI Level 7): Regular starting at 12/17 0848    Turn patient starting at 12/16 2200    Elevate HOB starting at 12/16 2017        Full Code    Viral Archer MD  Neurocritical Care  [facility] - Neuro Critical Care    Level III

## 2022-12-20 NOTE — SUBJECTIVE & OBJECTIVE
Interval History: 12/20: NAEON, neuro stable. No headaches, LD removed this morning, OK for TTF today to ENT    Medications:  Continuous Infusions:  Scheduled Meds:   aspirin  81 mg Oral Once    atorvastatin  80 mg Oral Daily    cefTRIAXone (ROCEPHIN) IVPB  2 g Intravenous Q12H    [START ON 12/21/2022] finasteride  5 mg Oral QAM    gabapentin  400 mg Oral TID    heparin (porcine)  5,000 Units Subcutaneous Q8H    [START ON 12/21/2022] levothyroxine  50 mcg Oral Before breakfast    levothyroxine  50 mcg Oral Before breakfast    [START ON 12/21/2022] losartan  25 mg Oral Daily    methocarbamoL  750 mg Oral QID    [START ON 12/21/2022] polyethylene glycol  17 g Oral Daily    polyethylene glycol  17 g Oral Daily    QUEtiapine  25 mg Oral QHS    [START ON 12/21/2022] senna-docusate 8.6-50 mg  1 tablet Oral Daily    senna-docusate 8.6-50 mg  1 tablet Oral BID    sodium chloride  2 spray Each Nostril TID    [START ON 12/21/2022] tadalafiL  5 mg Oral Daily    [START ON 12/21/2022] tamsulosin  0.4 mg Oral QAM    tamsulosin  0.4 mg Oral Daily     PRN Meds:acetaminophen, acetaminophen, acetaminophen, gabapentin, labetalol, LIDOcaine (PF) 10 mg/ml (1%), melatonin, ondansetron, oxyCODONE, oxyCODONE, prochlorperazine, QUEtiapine, sodium chloride 0.9%, sodium chloride 0.9%, tiZANidine, zolpidem     Review of Systems  Objective:     Weight: 88.9 kg (196 lb)  Body mass index is 28.94 kg/m².  Vital Signs (Most Recent):  Temp: 98.3 °F (36.8 °C) (12/20/22 1600)  Pulse: 73 (12/20/22 1200)  Resp: (!) 22 (12/20/22 1200)  BP: (!) 142/82 (12/20/22 1200)  SpO2: (!) 92 % (12/20/22 1200)   Vital Signs (24h Range):  Temp:  [97.7 °F (36.5 °C)-98.3 °F (36.8 °C)] 98.3 °F (36.8 °C)  Pulse:  [59-85] 73  Resp:  [12-28] 22  SpO2:  [91 %-97 %] 92 %  BP: (117-160)/(68-94) 142/82     Date 12/20/22 0700 - 12/21/22 0659   Shift 5432-4580 6909-6469 3115-5583 24 Hour Total   INTAKE   P.O. 550   550   IV Piggyback 46   46   Shift Total(mL/kg) 596(6.7)    596(6.7)   OUTPUT   Urine(mL/kg/hr) 1200(1.7) 400  1600   Shift Total(mL/kg) 1200(13.5) 400(4.5)  1600(18)   Weight (kg) 88.9 88.9 88.9 88.9                     Male External Urinary Catheter 12/16/22 2030 Medium (Active)   Collection Container Urimeter 12/18/22 0305   Securement Method secured to top of thigh w/ leg strap 12/18/22 0305   Skin no breakdown;no redness 12/18/22 0305   Tolerance no signs/symptoms of discomfort 12/18/22 0305   Output (mL) 475 mL 12/18/22 0605   Catheter Change Date 12/17/22 12/18/22 0305   Catheter Change Time 2030 12/17/22 0305            Lumbar Drain 12/16/22 2000 (Active)   Drain Status Clamped 12/18/22 0305   Drain Level (cm) 10 cm 12/17/22 1905   Level to other (see comment) 12/18/22 0305   CSF Color Other (Comment) 12/18/22 0305   Site Description Healing 12/18/22 0305   Dressing Status Intact 12/18/22 0305   Interventions HOB degrees (see comment);Bed controls locked 12/18/22 0305   Output (mL) 5 mL 12/18/22 0605       Physical Exam    Neurosurgery Physical Exam  E4V5M6  Aox3  Cni, PERRL  Fcx4 AG  SILT     No drift  LD site c/d/I with tegaderms securing    Significant Labs:  Recent Labs   Lab 12/19/22  0358 12/20/22  0308   GLU 98 90    140   K 3.9 4.2    104   CO2 27 25   BUN 12 14   CREATININE 0.8 0.9   CALCIUM 8.8 8.9   MG 2.4 2.1       Recent Labs   Lab 12/19/22  0358 12/20/22  0308   WBC 4.65 7.05   HGB 16.0 16.4   HCT 48.3 48.4    193       No results for input(s): LABPT, INR, APTT in the last 48 hours.    Microbiology Results (last 7 days)       ** No results found for the last 168 hours. **          All pertinent labs from the last 24 hours have been reviewed.    Significant Diagnostics:  I have reviewed and interpreted all pertinent imaging results/findings within the past 24 hours.  No results found in the last 24 hours.

## 2022-12-20 NOTE — PROGRESS NOTES
[facility]  Neurosurgery  Progress Note    Subjective:     History of Present Illness: Sarabjit Velasquez is a 67 y.o. male who presents for evaluation of a possible CSF leak, which has been present for about 1 week. He reports having revision ESS on 12/8/22 with Dr. Thomson who noted some thinning of the skull base during surgery. Patient reports that he put some mesh over the defect. Since surgery has been having mostly left sided nasal drainage, clear in nature. Causing him to cough significantly keeping him up at night. Reports some head aches and low grade fevers (around 100F). He denies any changes in his mental status, no neck stiffness, no photophobia or nausea/vomiting. He was started on Cefuroxime by Dr. Thomson. He reports having previous ESS x 2, one in 2016, and the most recent one last week. Was told there was some fungus within one of his sinuses.       Post-Op Info:  Procedure(s) (LRB):  REPAIR, ETHMOID SINUS, ENDOSCOPIC, FOR CSF LEAK (Left)  FESS, USING COMPUTER-ASSISTED NAVIGATION (Bilateral)  LUMBAR PUNCTURE (N/A)   4 Days Post-Op     Interval History: 12/20: SHALOM, neuro stable. No headaches, LD removed this morning, OK for TTF today to ENT    Medications:  Continuous Infusions:  Scheduled Meds:   aspirin  81 mg Oral Once    atorvastatin  80 mg Oral Daily    cefTRIAXone (ROCEPHIN) IVPB  2 g Intravenous Q12H    [START ON 12/21/2022] finasteride  5 mg Oral QAM    gabapentin  400 mg Oral TID    heparin (porcine)  5,000 Units Subcutaneous Q8H    [START ON 12/21/2022] levothyroxine  50 mcg Oral Before breakfast    levothyroxine  50 mcg Oral Before breakfast    [START ON 12/21/2022] losartan  25 mg Oral Daily    methocarbamoL  750 mg Oral QID    [START ON 12/21/2022] polyethylene glycol  17 g Oral Daily    polyethylene glycol  17 g Oral Daily    QUEtiapine  25 mg Oral QHS    [START ON 12/21/2022] senna-docusate 8.6-50 mg  1 tablet Oral Daily    senna-docusate 8.6-50 mg   1 tablet Oral BID    sodium chloride  2 spray Each Nostril TID    [START ON 12/21/2022] tadalafiL  5 mg Oral Daily    [START ON 12/21/2022] tamsulosin  0.4 mg Oral QAM    tamsulosin  0.4 mg Oral Daily     PRN Meds:acetaminophen, acetaminophen, acetaminophen, gabapentin, labetalol, LIDOcaine (PF) 10 mg/ml (1%), melatonin, ondansetron, oxyCODONE, oxyCODONE, prochlorperazine, QUEtiapine, sodium chloride 0.9%, sodium chloride 0.9%, tiZANidine, zolpidem     Review of Systems  Objective:     Weight: 88.9 kg (196 lb)  Body mass index is 28.94 kg/m².  Vital Signs (Most Recent):  Temp: 98.3 °F (36.8 °C) (12/20/22 1600)  Pulse: 73 (12/20/22 1200)  Resp: (!) 22 (12/20/22 1200)  BP: (!) 142/82 (12/20/22 1200)  SpO2: (!) 92 % (12/20/22 1200)   Vital Signs (24h Range):  Temp:  [97.7 °F (36.5 °C)-98.3 °F (36.8 °C)] 98.3 °F (36.8 °C)  Pulse:  [59-85] 73  Resp:  [12-28] 22  SpO2:  [91 %-97 %] 92 %  BP: (117-160)/(68-94) 142/82     Date 12/20/22 0700 - 12/21/22 0659   Shift 4054-2672 1648-7400 4191-7282 24 Hour Total   INTAKE   P.O. 550   550   IV Piggyback 46   46   Shift Total(mL/kg) 596(6.7)   596(6.7)   OUTPUT   Urine(mL/kg/hr) 1200(1.7) 400  1600   Shift Total(mL/kg) 1200(13.5) 400(4.5)  1600(18)   Weight (kg) 88.9 88.9 88.9 88.9                     Male External Urinary Catheter 12/16/22 2030 Medium (Active)   Collection Container Urimeter 12/18/22 030   Securement Method secured to top of thigh w/ leg strap 12/18/22 0305   Skin no breakdown;no redness 12/18/22 0305   Tolerance no signs/symptoms of discomfort 12/18/22 0305   Output (mL) 475 mL 12/18/22 0605   Catheter Change Date 12/17/22 12/18/22 0305   Catheter Change Time 2030 12/17/22 0305            Lumbar Drain 12/16/22 2000 (Active)   Drain Status Clamped 12/18/22 0305   Drain Level (cm) 10 cm 12/17/22 1905   Level to other (see comment) 12/18/22 0305   CSF Color Other (Comment) 12/18/22 0305   Site Description Healing 12/18/22 0305   Dressing Status Intact  12/18/22 0305   Interventions HOB degrees (see comment);Bed controls locked 12/18/22 0305   Output (mL) 5 mL 12/18/22 0605       Physical Exam    Neurosurgery Physical Exam  E4V5M6  Aox3  Cni, PERRL  Fcx4 AG  SILT     No drift  LD site c/d/I with tegaderms securing    Significant Labs:  Recent Labs   Lab 12/19/22  0358 12/20/22  0308   GLU 98 90    140   K 3.9 4.2    104   CO2 27 25   BUN 12 14   CREATININE 0.8 0.9   CALCIUM 8.8 8.9   MG 2.4 2.1       Recent Labs   Lab 12/19/22  0358 12/20/22  0308   WBC 4.65 7.05   HGB 16.0 16.4   HCT 48.3 48.4    193       No results for input(s): LABPT, INR, APTT in the last 48 hours.    Microbiology Results (last 7 days)       ** No results found for the last 168 hours. **          All pertinent labs from the last 24 hours have been reviewed.    Significant Diagnostics:  I have reviewed and interpreted all pertinent imaging results/findings within the past 24 hours.  No results found in the last 24 hours.    Assessment/Plan:     Status post functional endoscopic sinus surgery (FESS)  67M with LD placed on 12/16/22 for ethmoidal CSF fistual repair with ENT    Plan:   --icu, OK for step down to ENT today   --q1 nc/vs icu, q2 stepdown, q4 floor  --all labs and significant diagnostics reviewed  --LD removed tomorrow  --please notfiy nsgy of any acute neurological decline    Dispo: stepdown to ENT        Iesha Shields MD  Neurosurgery  [facility]

## 2022-12-20 NOTE — PROGRESS NOTES
[facility]  Otorhinolaryngology-Head & Neck Surgery  Progress Note    Subjective:     Post-Op Info:  Procedure(s) (LRB):  REPAIR, ETHMOID SINUS, ENDOSCOPIC, FOR CSF LEAK (Left)  FESS, USING COMPUTER-ASSISTED NAVIGATION (Bilateral)  LUMBAR PUNCTURE (N/A)   4 Days Post-Op  Hospital Day: 5     Interval History: Drain clamped yesterday. No leakage. Overall doing well.     Medications:  Continuous Infusions:  Scheduled Meds:   atorvastatin  80 mg Oral Daily    cefTRIAXone (ROCEPHIN) IVPB  2 g Intravenous Q24H    gabapentin  400 mg Oral TID    heparin (porcine)  5,000 Units Subcutaneous Q8H    levothyroxine  50 mcg Oral Before breakfast    methocarbamoL  750 mg Oral QID    polyethylene glycol  17 g Oral Daily    senna-docusate 8.6-50 mg  1 tablet Oral BID    sodium chloride  2 spray Each Nostril TID    tamsulosin  0.4 mg Oral Daily     PRN Meds:acetaminophen, labetalol, oxyCODONE, QUEtiapine, sodium chloride 0.9%     Review of patient's allergies indicates:  No Known Allergies  Objective:     Vital Signs (24h Range):  Temp:  [97.7 °F (36.5 °C)-98 °F (36.7 °C)] 97.9 °F (36.6 °C)  Pulse:  [55-94] 73  Resp:  [12-28] 17  SpO2:  [91 %-97 %] 94 %  BP: (117-160)/(68-91) 153/79       Lines/Drains/Airways       Drain  Duration                  Lumbar Drain 12/16/22 2000 3 days    Male External Urinary Catheter 12/16/22 2030 Medium 3 days              Peripheral Intravenous Line  Duration                  Peripheral IV - Single Lumen 12/16/22 2300 22 G Anterior;Right Forearm 3 days         Peripheral IV - Single Lumen 12/19/22 2000 20 G Anterior;Proximal;Right Forearm <1 day                    Physical Exam  Constitutional:       Appearance: Normal appearance.   HENT:      Nose:      Comments: No drainage      Mouth/Throat:      Pharynx: Oropharynx is clear.   Eyes:      Extraocular Movements: Extraocular movements intact.   Neurological:      Mental Status: He is alert.      Significant Labs:  CBC:    Recent Labs   Lab 12/20/22  0308   WBC 7.05   RBC 5.09   HGB 16.4   HCT 48.4      MCV 95   MCH 32.2*   MCHC 33.9     CMP:   Recent Labs   Lab 12/20/22  0308   GLU 90   CALCIUM 8.9   ALBUMIN 3.1*   PROT 6.4      K 4.2   CO2 25      BUN 14   CREATININE 0.9   ALKPHOS 90   ALT 72*   AST 44*   BILITOT 0.5       Significant Diagnostics:  None    Assessment/Plan:     * Postoperative CSF leak  67 y.o M with prior FESS and development of CSF leak, s/p lumbar drain, defect noted to be at L cribriform plate, s/p  middle turbinate flap on 12/16. Postop CTH appropriate.     LD clamped 12/19, no drainage     - CSF leak precautions   - Sinonasal precautions; no nose blowing, sneeze with mouth open   - No straining, stool softeners   - HOB at 30 degrees   - OK for LD removal   - Rocephin  - Saline spray TID     Dispo: Transfer to floor           Lisa Cherry MD  Otorhinolaryngology-Head & Neck Surgery  [facility]

## 2022-12-20 NOTE — SUBJECTIVE & OBJECTIVE
Interval History: Drain clamped yesterday. No leakage. Overall doing well.     Medications:  Continuous Infusions:  Scheduled Meds:   atorvastatin  80 mg Oral Daily    cefTRIAXone (ROCEPHIN) IVPB  2 g Intravenous Q24H    gabapentin  400 mg Oral TID    heparin (porcine)  5,000 Units Subcutaneous Q8H    levothyroxine  50 mcg Oral Before breakfast    methocarbamoL  750 mg Oral QID    polyethylene glycol  17 g Oral Daily    senna-docusate 8.6-50 mg  1 tablet Oral BID    sodium chloride  2 spray Each Nostril TID    tamsulosin  0.4 mg Oral Daily     PRN Meds:acetaminophen, labetalol, oxyCODONE, QUEtiapine, sodium chloride 0.9%     Review of patient's allergies indicates:  No Known Allergies  Objective:     Vital Signs (24h Range):  Temp:  [97.7 °F (36.5 °C)-98 °F (36.7 °C)] 97.9 °F (36.6 °C)  Pulse:  [55-94] 73  Resp:  [12-28] 17  SpO2:  [91 %-97 %] 94 %  BP: (117-160)/(68-91) 153/79       Lines/Drains/Airways       Drain  Duration                  Lumbar Drain 12/16/22 2000 3 days    Male External Urinary Catheter 12/16/22 2030 Medium 3 days              Peripheral Intravenous Line  Duration                  Peripheral IV - Single Lumen 12/16/22 2300 22 G Anterior;Right Forearm 3 days         Peripheral IV - Single Lumen 12/19/22 2000 20 G Anterior;Proximal;Right Forearm <1 day                    Physical Exam  Constitutional:       Appearance: Normal appearance.   HENT:      Nose:      Comments: No drainage      Mouth/Throat:      Pharynx: Oropharynx is clear.   Eyes:      Extraocular Movements: Extraocular movements intact.   Neurological:      Mental Status: He is alert.      Significant Labs:  CBC:   Recent Labs   Lab 12/20/22  0308   WBC 7.05   RBC 5.09   HGB 16.4   HCT 48.4      MCV 95   MCH 32.2*   MCHC 33.9     CMP:   Recent Labs   Lab 12/20/22  0308   GLU 90   CALCIUM 8.9   ALBUMIN 3.1*   PROT 6.4      K 4.2   CO2 25      BUN 14   CREATININE 0.9   ALKPHOS 90   ALT 72*   AST 44*   BILITOT 0.5        Significant Diagnostics:  None

## 2022-12-20 NOTE — ASSESSMENT & PLAN NOTE
67 y.o M with prior FESS and development of CSF leak, s/p lumbar drain, defect noted to be at L cribriform plate, s/p  middle turbinate flap on 12/16. Postop CTH appropriate.     LD clamped 12/19, no drainage     - CSF leak precautions   - Sinonasal precautions; no nose blowing, sneeze with mouth open   - No straining, stool softeners   - HOB at 30 degrees   - OK for LD removal   - Rocephin  - Saline spray TID     Dispo: Transfer to floor

## 2022-12-20 NOTE — PLAN OF CARE
[facility] Care Plan    POC reviewed with Sarabjit Velasquez and family at 0300. Pt verbalized understanding. Questions and concerns addressed. No acute events overnight. Pt progressing toward goals. Will continue to monitor. See below and flowsheets for full assessment and VS info.     - lumbar drain clamped overnight  - PRN oxycodone given x1      Is this a stroke patient? no    Neuro:  [place] Coma Scale  Best Eye Response: 4-->(E4) spontaneous  Best Motor Response: 6-->(M6) obeys commands  Best Verbal Response: 5-->(V5) oriented  [place] Coma Scale Score: 15  Assessment Qualifiers: no eye obstruction present  Pupil PERRLA: yes     24hr Temp:  [97.7 °F (36.5 °C)-98 °F (36.7 °C)]     CV:   Rhythm: normal sinus rhythm  BP goals:   SBP < 160  MAP > 65    Resp:           Plan: N/A    GI/:     Diet/Nutrition Received: regular  Last Bowel Movement: 12/18/22  Voiding Characteristics: external catheter    Intake/Output Summary (Last 24 hours) at 12/20/2022 0537  Last data filed at 12/20/2022 0205  Gross per 24 hour   Intake 680.62 ml   Output 3325 ml   Net -2644.38 ml     Unmeasured Output  Stool Occurrence: 1    Labs/Accuchecks:  Recent Labs   Lab 12/20/22 0308   WBC 7.05   RBC 5.09   HGB 16.4   HCT 48.4         Recent Labs   Lab 12/20/22 0308      K 4.2   CO2 25      BUN 14   CREATININE 0.9   ALKPHOS 90   ALT 72*   AST 44*   BILITOT 0.5      Recent Labs   Lab 12/16/22 2037   INR 1.1   APTT 24.3    No results for input(s): CPK, CPKMB, TROPONINI, MB in the last 168 hours.    Electrolytes: N/A - electrolytes WDL  Accuchecks: none    Gtts:      LDA/Wounds:  Lines/Drains/Airways       Drain  Duration                  Lumbar Drain 12/16/22 2000 3 days    Male External Urinary Catheter 12/16/22 2030 Medium 3 days              Peripheral Intravenous Line  Duration                  Peripheral IV - Single Lumen 12/16/22 1402 18 G Posterior;Right Forearm 3 days         Peripheral IV - Single Lumen 12/16/22 2300  22 G Anterior;Right Forearm 3 days         Peripheral IV - Single Lumen 12/19/22 2000 20 G Anterior;Proximal;Right Forearm <1 day                  Wounds: Yes  Wound care consulted: No

## 2022-12-21 VITALS
HEART RATE: 82 BPM | WEIGHT: 196 LBS | TEMPERATURE: 98 F | OXYGEN SATURATION: 93 % | SYSTOLIC BLOOD PRESSURE: 142 MMHG | RESPIRATION RATE: 18 BRPM | DIASTOLIC BLOOD PRESSURE: 75 MMHG | HEIGHT: 69 IN | BODY MASS INDEX: 29.03 KG/M2

## 2022-12-21 LAB
ALBUMIN SERPL BCP-MCNC: 3 G/DL (ref 3.5–5.2)
ALBUMIN SERPL BCP-MCNC: 3 G/DL (ref 3.5–5.2)
ALP SERPL-CCNC: 92 U/L (ref 55–135)
ALP SERPL-CCNC: 92 U/L (ref 55–135)
ALT SERPL W/O P-5'-P-CCNC: 81 U/L (ref 10–44)
ALT SERPL W/O P-5'-P-CCNC: 81 U/L (ref 10–44)
ANION GAP SERPL CALC-SCNC: 9 MMOL/L (ref 8–16)
ANION GAP SERPL CALC-SCNC: 9 MMOL/L (ref 8–16)
AST SERPL-CCNC: 42 U/L (ref 10–40)
AST SERPL-CCNC: 42 U/L (ref 10–40)
BASOPHILS # BLD AUTO: 0.05 K/UL (ref 0–0.2)
BASOPHILS NFR BLD: 0.7 % (ref 0–1.9)
BILIRUB SERPL-MCNC: 0.6 MG/DL (ref 0.1–1)
BILIRUB SERPL-MCNC: 0.6 MG/DL (ref 0.1–1)
BUN SERPL-MCNC: 13 MG/DL (ref 8–23)
BUN SERPL-MCNC: 13 MG/DL (ref 8–23)
CALCIUM SERPL-MCNC: 9 MG/DL (ref 8.7–10.5)
CALCIUM SERPL-MCNC: 9 MG/DL (ref 8.7–10.5)
CHLORIDE SERPL-SCNC: 103 MMOL/L (ref 95–110)
CHLORIDE SERPL-SCNC: 103 MMOL/L (ref 95–110)
CO2 SERPL-SCNC: 26 MMOL/L (ref 23–29)
CO2 SERPL-SCNC: 26 MMOL/L (ref 23–29)
CREAT SERPL-MCNC: 0.9 MG/DL (ref 0.5–1.4)
CREAT SERPL-MCNC: 0.9 MG/DL (ref 0.5–1.4)
DIFFERENTIAL METHOD: ABNORMAL
EOSINOPHIL # BLD AUTO: 0.3 K/UL (ref 0–0.5)
EOSINOPHIL NFR BLD: 4.1 % (ref 0–8)
ERYTHROCYTE [DISTWIDTH] IN BLOOD BY AUTOMATED COUNT: 14.4 % (ref 11.5–14.5)
EST. GFR  (NO RACE VARIABLE): >60 ML/MIN/1.73 M^2
EST. GFR  (NO RACE VARIABLE): >60 ML/MIN/1.73 M^2
GLUCOSE SERPL-MCNC: 93 MG/DL (ref 70–110)
GLUCOSE SERPL-MCNC: 93 MG/DL (ref 70–110)
HCT VFR BLD AUTO: 50.1 % (ref 40–54)
HGB BLD-MCNC: 16.2 G/DL (ref 14–18)
IMM GRANULOCYTES # BLD AUTO: 0.11 K/UL (ref 0–0.04)
IMM GRANULOCYTES NFR BLD AUTO: 1.5 % (ref 0–0.5)
LYMPHOCYTES # BLD AUTO: 1.1 K/UL (ref 1–4.8)
LYMPHOCYTES NFR BLD: 14.9 % (ref 18–48)
MAGNESIUM SERPL-MCNC: 2.2 MG/DL (ref 1.6–2.6)
MCH RBC QN AUTO: 31 PG (ref 27–31)
MCHC RBC AUTO-ENTMCNC: 32.3 G/DL (ref 32–36)
MCV RBC AUTO: 96 FL (ref 82–98)
MONOCYTES # BLD AUTO: 0.5 K/UL (ref 0.3–1)
MONOCYTES NFR BLD: 7.1 % (ref 4–15)
NEUTROPHILS # BLD AUTO: 5.2 K/UL (ref 1.8–7.7)
NEUTROPHILS NFR BLD: 71.7 % (ref 38–73)
NRBC BLD-RTO: 0 /100 WBC
PHOSPHATE SERPL-MCNC: 4 MG/DL (ref 2.7–4.5)
PLATELET # BLD AUTO: 245 K/UL (ref 150–450)
PMV BLD AUTO: 10 FL (ref 9.2–12.9)
POTASSIUM SERPL-SCNC: 4.2 MMOL/L (ref 3.5–5.1)
POTASSIUM SERPL-SCNC: 4.2 MMOL/L (ref 3.5–5.1)
PROT SERPL-MCNC: 6.2 G/DL (ref 6–8.4)
PROT SERPL-MCNC: 6.2 G/DL (ref 6–8.4)
RBC # BLD AUTO: 5.23 M/UL (ref 4.6–6.2)
SODIUM SERPL-SCNC: 138 MMOL/L (ref 136–145)
SODIUM SERPL-SCNC: 138 MMOL/L (ref 136–145)
WBC # BLD AUTO: 7.3 K/UL (ref 3.9–12.7)

## 2022-12-21 PROCEDURE — 83735 ASSAY OF MAGNESIUM: CPT

## 2022-12-21 PROCEDURE — 25000003 PHARM REV CODE 250: Performed by: STUDENT IN AN ORGANIZED HEALTH CARE EDUCATION/TRAINING PROGRAM

## 2022-12-21 PROCEDURE — 80053 COMPREHEN METABOLIC PANEL: CPT | Performed by: STUDENT IN AN ORGANIZED HEALTH CARE EDUCATION/TRAINING PROGRAM

## 2022-12-21 PROCEDURE — 25000003 PHARM REV CODE 250

## 2022-12-21 PROCEDURE — 99024 PR POST-OP FOLLOW-UP VISIT: ICD-10-PCS | Mod: POP,,, | Performed by: PHYSICIAN ASSISTANT

## 2022-12-21 PROCEDURE — 63600175 PHARM REV CODE 636 W HCPCS: Performed by: STUDENT IN AN ORGANIZED HEALTH CARE EDUCATION/TRAINING PROGRAM

## 2022-12-21 PROCEDURE — 99024 POSTOP FOLLOW-UP VISIT: CPT | Mod: POP,,, | Performed by: PHYSICIAN ASSISTANT

## 2022-12-21 PROCEDURE — 36415 COLL VENOUS BLD VENIPUNCTURE: CPT | Performed by: STUDENT IN AN ORGANIZED HEALTH CARE EDUCATION/TRAINING PROGRAM

## 2022-12-21 PROCEDURE — 63600175 PHARM REV CODE 636 W HCPCS

## 2022-12-21 PROCEDURE — 84100 ASSAY OF PHOSPHORUS: CPT

## 2022-12-21 PROCEDURE — 85025 COMPLETE CBC W/AUTO DIFF WBC: CPT

## 2022-12-21 RX ORDER — AMOXICILLIN AND CLAVULANATE POTASSIUM 500; 125 MG/1; MG/1
1 TABLET, FILM COATED ORAL 2 TIMES DAILY
Qty: 10 TABLET | Refills: 0 | Status: SHIPPED | OUTPATIENT
Start: 2022-12-21 | End: 2022-12-26

## 2022-12-21 RX ORDER — ACETAMINOPHEN 325 MG/1
650 TABLET ORAL EVERY 6 HOURS PRN
Qty: 30 TABLET | Refills: 0 | Status: SHIPPED | OUTPATIENT
Start: 2022-12-21

## 2022-12-21 RX ORDER — ONDANSETRON 4 MG/1
4 TABLET, FILM COATED ORAL EVERY 6 HOURS PRN
Qty: 15 TABLET | Refills: 0 | Status: SHIPPED | OUTPATIENT
Start: 2022-12-21

## 2022-12-21 RX ORDER — IBUPROFEN 800 MG/1
800 TABLET ORAL EVERY 6 HOURS PRN
Qty: 30 TABLET | Refills: 0 | Status: SHIPPED | OUTPATIENT
Start: 2022-12-21

## 2022-12-21 RX ORDER — POLYETHYLENE GLYCOL 3350 17 G/17G
17 POWDER, FOR SOLUTION ORAL DAILY
Qty: 510 G | Refills: 0 | Status: SHIPPED | OUTPATIENT
Start: 2022-12-21

## 2022-12-21 RX ADMIN — GABAPENTIN 400 MG: 400 CAPSULE ORAL at 02:12

## 2022-12-21 RX ADMIN — HEPARIN SODIUM 5000 UNITS: 5000 INJECTION INTRAVENOUS; SUBCUTANEOUS at 06:12

## 2022-12-21 RX ADMIN — LOSARTAN POTASSIUM 25 MG: 25 TABLET, FILM COATED ORAL at 09:12

## 2022-12-21 RX ADMIN — OXYCODONE HYDROCHLORIDE 10 MG: 10 TABLET ORAL at 06:12

## 2022-12-21 RX ADMIN — GABAPENTIN 400 MG: 400 CAPSULE ORAL at 09:12

## 2022-12-21 RX ADMIN — LEVOTHYROXINE SODIUM 50 MCG: 50 TABLET ORAL at 06:12

## 2022-12-21 RX ADMIN — Medication 2 SPRAY: at 09:12

## 2022-12-21 RX ADMIN — FINASTERIDE 5 MG: 5 TABLET, FILM COATED ORAL at 06:12

## 2022-12-21 RX ADMIN — TAMSULOSIN HYDROCHLORIDE 0.4 MG: 0.4 CAPSULE ORAL at 09:12

## 2022-12-21 RX ADMIN — CEFTRIAXONE 2 G: 2 INJECTION, POWDER, FOR SOLUTION INTRAMUSCULAR; INTRAVENOUS at 09:12

## 2022-12-21 RX ADMIN — METHOCARBAMOL 750 MG: 750 TABLET ORAL at 02:12

## 2022-12-21 RX ADMIN — POLYETHYLENE GLYCOL 3350 17 G: 17 POWDER, FOR SOLUTION ORAL at 09:12

## 2022-12-21 RX ADMIN — SENNOSIDES AND DOCUSATE SODIUM 1 TABLET: 50; 8.6 TABLET ORAL at 09:12

## 2022-12-21 RX ADMIN — ATORVASTATIN CALCIUM 80 MG: 40 TABLET, FILM COATED ORAL at 09:12

## 2022-12-21 RX ADMIN — METHOCARBAMOL 750 MG: 750 TABLET ORAL at 09:12

## 2022-12-21 RX ADMIN — HEPARIN SODIUM 5000 UNITS: 5000 INJECTION INTRAVENOUS; SUBCUTANEOUS at 02:12

## 2022-12-21 NOTE — PLAN OF CARE
[facility] ([facility])  Discharge Final Note    Primary Care Provider: Herbert Valles MD  Expected Discharge Date: 12/21/2022  Patient medically ready for discharge to Home.  Nurse can call report to NA.  Transportation Car per Family.   Is family/patient aware of discharge Fern Vealsquez , [phone].  Hospital follow up scheduled, See below.    Final Discharge Note (most recent)       Final Note - 12/21/22 1518          Final Note    Assessment Type Final Discharge Note (P)      What phone number can be called within the next 1-3 days to see how you are doing after discharge? [phone] (P)      Hospital Resources/Appts/Education Provided Appointments scheduled by Navigator/Coordinator (P)                    Important Message from Medicare  Important Message from Medicare regarding Discharge Appeal Rights: Given to patient/caregiver, Explained to patient/caregiver, Signed/date by patient/caregiver   Date IMM was signed: 12/21/22  Time IMM was signed: 0914  Referral Info (most recent)       Referral Info    No documentation.                 Contact Info       Paulino Zhu MD   Specialty: Otolaryngology    [address]  [facility] [address]   Phone: [phone]       Next Steps: Follow up on 12/27/2022          Future Appointments   Date Time Provider Department Center   12/27/2022  1:30 PM Paulino Zhu MD [facility] Bj Cifuentes RN  Case Management  Ext: [phone]  12/21/2022

## 2022-12-21 NOTE — DISCHARGE INSTRUCTIONS
INSTRUCTIONS TO FOLLOW AFTER SINUS AND NASAL SURGERY  [facility]    Office hours:  Weekdays 8:00 am to 5:00 pm.  Please call [phone] and ask to speak with his nurse or medical assistant.    After-hours & weekends:  Please call [phone] and ask to speak with the ENT resident doctor.    Please call immediately if you have:    Temperature of 101° F or greater  Any unusual, painful swelling  Any active bleeding that saturates more than a 4x4 gauze  Any thick drainage green or yellow drainage  Changes in vision or swelling around the eye  Pain not relieved by your prescribed pain medication    ACTIVITY:    Sleep on your back with the head of the bead elevated, up on 2-3 pillows, or in a recliner for the first 3 to 5 days.     You may wake up after surgery with thick white stockings on. Wear them until you are walking around more. It is important to walk around often while at home to keep your blood circulating and prevent blood clots.    You may shower.    RESTRICTED ACTIVITIES AFTER SURGERY:    DO NOT blow your nose for 2 weeks. The only exception is that you may lightly blow your nose after using the sinus rinse. If you have to sneeze or cough, do so with your mouth open.     AVOID all heavy lifting, straining or bending for 2 weeks.     AVOID any sexual activity for 2 weeks after surgery.    AVOID semi-contact sports or vigorous exercising for 2 weeks. Dr. Temple will let you know when you are cleared to resume exercise.    AVOID flying or swimming for 2 weeks.      DO NOT operate a motor vehicle or any type of heavy machinery within 24 hours of taking prescription pain medication.    DO NOT smoke or be around smokers.    AVOID irritating substances that might make you sneeze, such as dust, chalk, harsh chemicals, and allergic triggers. This might also include spicy foods.    DRESSINGS:    Change the gauze mustache dressing under your nose as needed. (If unsure what this dressing is or how to do this, ask  your doctor or nurse before you leave the hospital.) You may have pinkish-red drainage for 2-3 days.    Usually there is no gauze packing placed inside the nose.  If packing is necessary, you will be informed by your surgeon.  Do not touch or pull at the packing. The packing will be removed by your doctor at your first visit after surgery.     You may also have a dissolvable stent or dissolvable sponge placed into the sinuses during surgery.  These usually do not need to be removed. You may notice small fragments of these items come out of your nose in the weeks following surgery.    MEDICATIONS:    After surgery, you will be sent home with prescriptions for pain medication and an anti-nausea medication and an antibiotic.    Most people need pain medication for the first few days after surgery, although a narcotic is rarely necessary. The best pain control comes from a combination of ACETAMINOPHEN (Tylenol) and IBUPROFEN (Motrin). You will be given prescriptions for these at the recommended dose. These can be alternated so that you are taking something every 2 or 3 hours.    Some people have problems with bowel movements after surgery. If you have NOT had a bowel movement 3-5 days after surgery, go to your local pharmacy and purchase an over the counter stool softener such as COLACE. You can also ask the pharmacist for his or her recommendation. If you still do not have a bowel movement after starting the softener, please call the office.    DIET:    Avoid hot and spicy foods for 1 week after surgery.    Begin with bland foods the evening after surgery and advance to your regular diet as tolerated. It is not necessary to take only soft food unless you are recovering from tonsil surgery.    Drink plenty of fluids (water is best).     Avoid alcoholic and caffeinated beverages for 1 week after surgery because they can cause you to become dehydrated.

## 2022-12-21 NOTE — SUBJECTIVE & OBJECTIVE
Interval History: NAEON. Denies positional headache or rhinorrhea.     Medications:  Continuous Infusions:  Scheduled Meds:   atorvastatin  80 mg Oral Daily    cefTRIAXone (ROCEPHIN) IVPB  2 g Intravenous Q12H    finasteride  5 mg Oral QAM    gabapentin  400 mg Oral TID    heparin (porcine)  5,000 Units Subcutaneous Q8H    levothyroxine  50 mcg Oral Before breakfast    losartan  25 mg Oral Daily    methocarbamoL  750 mg Oral QID    polyethylene glycol  17 g Oral Daily    QUEtiapine  25 mg Oral QHS    senna-docusate 8.6-50 mg  1 tablet Oral BID    sodium chloride  2 spray Each Nostril TID    tadalafiL  5 mg Oral Daily    tamsulosin  0.4 mg Oral Daily     PRN Meds:acetaminophen, gabapentin, labetalol, LIDOcaine (PF) 10 mg/ml (1%), melatonin, ondansetron, oxyCODONE, oxyCODONE, prochlorperazine, sodium chloride 0.9%, sodium chloride 0.9%, tiZANidine, zolpidem     Review of Systems  Objective:     Weight: 88.9 kg (196 lb)  Body mass index is 28.94 kg/m².  Vital Signs (Most Recent):  Temp: 97 °F (36.1 °C) (12/21/22 0807)  Pulse: 93 (12/21/22 0831)  Resp: 18 (12/21/22 0807)  BP: (!) 121/56 (12/21/22 0807)  SpO2: (!) 92 % (12/21/22 0807)   Vital Signs (24h Range):  Temp:  [97 °F (36.1 °C)-98.7 °F (37.1 °C)] 97 °F (36.1 °C)  Pulse:  [61-93] 93  Resp:  [13-31] 18  SpO2:  [92 %-95 %] 92 %  BP: (105-175)/(56-97) 121/56                     Male External Urinary Catheter 12/16/22 2030 Medium (Active)   Collection Container Urimeter 12/20/22 1630   Securement Method secured to top of thigh w/ adhesive device 12/20/22 1630   Skin no redness;no breakdown 12/20/22 1630   Tolerance no signs/symptoms of discomfort 12/20/22 1630   Output (mL) 400 mL 12/20/22 1644   Catheter Change Date 12/19/22 12/20/22 0305   Catheter Change Time 1900 12/20/22 0305            Lumbar Drain 12/16/22 2000 (Active)   Drain Status Clamped 12/20/22 1200   Drain Level (cm) 10 cm 12/19/22 1905   Level to iliac crest 12/20/22 0305   CSF Color Other (Comment)  12/20/22 1200   Site Description Healing 12/20/22 1200   Dressing Status Old drainage 12/20/22 1200   Interventions Bed controls locked 12/20/22 1200   Output (mL) 5 mL 12/19/22 1605       Neurosurgery Physical Exam  General: well developed, well nourished, no distress.   Head: normocephalic, atraumatic  Nose: no rhinorrhea   Neurologic: Alert and oriented. Thought content appropriate.  Language: No aphasia  Speech: No dysarthria  Cranial nerves: face symmetric, tongue midline, CN II-XII grossly intact.   Eyes: pupils equal, round, reactive to light with accommodation, EOMI.   Pulmonary: normal respirations, no signs of respiratory distress  Skin: Skin is warm, dry and intact.  Sensory: intact to light touch throughout  Motor Strength:Moves all extremities spontaneously with good tone.  Full strength upper and lower extremities. No abnormal movements seen.   LD site: Monocryl intact, no drainage        Significant Labs:  Recent Labs   Lab 12/20/22  0308 12/21/22  0530   GLU 90 93  93    138  138   K 4.2 4.2  4.2    103  103   CO2 25 26  26   BUN 14 13  13   CREATININE 0.9 0.9  0.9   CALCIUM 8.9 9.0  9.0   MG 2.1 2.2     Recent Labs   Lab 12/20/22  0308 12/21/22  0530   WBC 7.05 7.30   HGB 16.4 16.2   HCT 48.4 50.1    245     No results for input(s): LABPT, INR, APTT in the last 48 hours.  Microbiology Results (last 7 days)       ** No results found for the last 168 hours. **          All pertinent labs from the last 24 hours have been reviewed.    Significant Diagnostics:  I have reviewed all pertinent imaging results/findings within the past 24 hours.

## 2022-12-21 NOTE — ASSESSMENT & PLAN NOTE
67M with LD placed on 12/16/22 for ethmoidal CSF fistual repair with ENT    Plan:   --LD removed 12/20 - no drainage from site; no positional headache  --please notfiy nsgy of any acute neurological decline    Dispo: per primary    NSGY will sign off, please call with questions/concerns

## 2022-12-21 NOTE — PLAN OF CARE
SSC met with patient/family at bedside. Patient experience rounding completed and reviewed the following.     Do you know your discharge plan? Yes     If yes, what is the plan? Home    If you are discharging home, do you have help at home?  Yes    Do you think you will need help at home at discharge? No     Have you discussed your needs and preferences with your SW/CM? Yes     Assigned SW/CM notified of any patient/family needs or concerns.

## 2022-12-21 NOTE — NURSING TRANSFER
Nursing Transfer Note      12/20/2022     Reason patient is being transferred: Stepdown    Transfer From: [facility]    Transfer via wheelchair    Transfer with RN, wife    Transported by RN    Medicines sent: Yes    Any special needs or follow-up needed: No    Chart send with patient: Yes    Notified: spouse    Patient reassessed at: 12/20/2022 1840 (date, time)    Upon arrival to floor: patient assessed, oriented to unit. Personal items, urinal, call light within reach. Patient assessed. All standard safety measures implemented.

## 2022-12-21 NOTE — PROGRESS NOTES
[facility] ([facility])  Neurosurgery  Progress Note    Subjective:     History of Present Illness: Sarabjit Velasquez is a 67 y.o. male who presents for evaluation of a possible CSF leak, which has been present for about 1 week. He reports having revision ESS on 12/8/22 with Dr. Thomson who noted some thinning of the skull base during surgery. Patient reports that he put some mesh over the defect. Since surgery has been having mostly left sided nasal drainage, clear in nature. Causing him to cough significantly keeping him up at night. Reports some head aches and low grade fevers (around 100F). He denies any changes in his mental status, no neck stiffness, no photophobia or nausea/vomiting. He was started on Cefuroxime by Dr. Thomson. He reports having previous ESS x 2, one in 2016, and the most recent one last week. Was told there was some fungus within one of his sinuses.       Post-Op Info:  Procedure(s) (LRB):  REPAIR, ETHMOID SINUS, ENDOSCOPIC, FOR CSF LEAK (Left)  FESS, USING COMPUTER-ASSISTED NAVIGATION (Bilateral)  LUMBAR PUNCTURE (N/A)   5 Days Post-Op     Interval History: NAEON. Denies positional headache or rhinorrhea.     Medications:  Continuous Infusions:  Scheduled Meds:   atorvastatin  80 mg Oral Daily    cefTRIAXone (ROCEPHIN) IVPB  2 g Intravenous Q12H    finasteride  5 mg Oral QAM    gabapentin  400 mg Oral TID    heparin (porcine)  5,000 Units Subcutaneous Q8H    levothyroxine  50 mcg Oral Before breakfast    losartan  25 mg Oral Daily    methocarbamoL  750 mg Oral QID    polyethylene glycol  17 g Oral Daily    QUEtiapine  25 mg Oral QHS    senna-docusate 8.6-50 mg  1 tablet Oral BID    sodium chloride  2 spray Each Nostril TID    tadalafiL  5 mg Oral Daily    tamsulosin  0.4 mg Oral Daily     PRN Meds:acetaminophen, gabapentin, labetalol, LIDOcaine (PF) 10 mg/ml (1%), melatonin, ondansetron, oxyCODONE, oxyCODONE, prochlorperazine, sodium chloride 0.9%, sodium chloride  0.9%, tiZANidine, zolpidem     Review of Systems  Objective:     Weight: 88.9 kg (196 lb)  Body mass index is 28.94 kg/m².  Vital Signs (Most Recent):  Temp: 97 °F (36.1 °C) (12/21/22 0807)  Pulse: 93 (12/21/22 0831)  Resp: 18 (12/21/22 0807)  BP: (!) 121/56 (12/21/22 0807)  SpO2: (!) 92 % (12/21/22 0807)   Vital Signs (24h Range):  Temp:  [97 °F (36.1 °C)-98.7 °F (37.1 °C)] 97 °F (36.1 °C)  Pulse:  [61-93] 93  Resp:  [13-31] 18  SpO2:  [92 %-95 %] 92 %  BP: (105-175)/(56-97) 121/56                     Male External Urinary Catheter 12/16/22 2030 Medium (Active)   Collection Container Urimeter 12/20/22 1630   Securement Method secured to top of thigh w/ adhesive device 12/20/22 1630   Skin no redness;no breakdown 12/20/22 1630   Tolerance no signs/symptoms of discomfort 12/20/22 1630   Output (mL) 400 mL 12/20/22 1644   Catheter Change Date 12/19/22 12/20/22 0305   Catheter Change Time 1900 12/20/22 0305            Lumbar Drain 12/16/22 2000 (Active)   Drain Status Clamped 12/20/22 1200   Drain Level (cm) 10 cm 12/19/22 1905   Level to iliac crest 12/20/22 0305   CSF Color Other (Comment) 12/20/22 1200   Site Description Healing 12/20/22 1200   Dressing Status Old drainage 12/20/22 1200   Interventions Bed controls locked 12/20/22 1200   Output (mL) 5 mL 12/19/22 1605       Neurosurgery Physical Exam  General: well developed, well nourished, no distress.   Head: normocephalic, atraumatic  Nose: no rhinorrhea   Neurologic: Alert and oriented. Thought content appropriate.  Language: No aphasia  Speech: No dysarthria  Cranial nerves: face symmetric, tongue midline, CN II-XII grossly intact.   Eyes: pupils equal, round, reactive to light with accommodation, EOMI.   Pulmonary: normal respirations, no signs of respiratory distress  Skin: Skin is warm, dry and intact.  Sensory: intact to light touch throughout  Motor Strength:Moves all extremities spontaneously with good tone.  Full strength upper and lower extremities. No  abnormal movements seen.   LD site: Monocryl intact, no drainage        Significant Labs:  Recent Labs   Lab 12/20/22  0308 12/21/22  0530   GLU 90 93  93    138  138   K 4.2 4.2  4.2    103  103   CO2 25 26  26   BUN 14 13  13   CREATININE 0.9 0.9  0.9   CALCIUM 8.9 9.0  9.0   MG 2.1 2.2     Recent Labs   Lab 12/20/22  0308 12/21/22  0530   WBC 7.05 7.30   HGB 16.4 16.2   HCT 48.4 50.1    245     No results for input(s): LABPT, INR, APTT in the last 48 hours.  Microbiology Results (last 7 days)       ** No results found for the last 168 hours. **          All pertinent labs from the last 24 hours have been reviewed.    Significant Diagnostics:  I have reviewed all pertinent imaging results/findings within the past 24 hours.    Assessment/Plan:     Status post functional endoscopic sinus surgery (FESS)  67M with LD placed on 12/16/22 for ethmoidal CSF fistual repair with ENT    Plan:   --LD removed 12/20 - no drainage from site; no positional headache  --please notfiy nsgy of any acute neurological decline    Dispo: per primary    NSGY will sign off, please call with questions/concerns        Mallorie Cruz PA-C  Neurosurgery  [facility] Neurosurgery ([facility])

## 2022-12-21 NOTE — NURSING
Discharge instructions reviewed with patient and spouse. Medications picked up by spouse from pharmacy. IV's removed. Wheelchair transport to garage.  Patient discharging home.

## 2022-12-21 NOTE — DISCHARGE SUMMARY
[facility] - Neurosurgery ([facility])  Otorhinolaryngology-Head & Neck Surgery  Discharge Summary      Patient Name: Sarabjit Velasquez  MRN: [id number]  Admission Date: 12/16/2022  Hospital Length of Stay: 5 days  Discharge Date and Time:  12/21/2022 9:01 AM  Attending Physician: Paulino Zhu MD   Discharging Provider: Lisa Cherry MD  Primary Care Provider: Herbert Valles MD     HPI: 67 y.o M with iatrogenic CSF leak s/p repair with middle turbinate flap and placement of lumbar drain performed on 12/16. Initially admitted to the [facility] for lumbar drain management. Lumbar drain was clamped on 12/19, then removed 12/20 as there was no concern for persistent leak. Patient was stepped down and observed following LD removal. Continued to have no leak.     Procedure(s) (LRB):  REPAIR, ETHMOID SINUS, ENDOSCOPIC, FOR CSF LEAK (Left)  FESS, USING COMPUTER-ASSISTED NAVIGATION (Bilateral)  LUMBAR PUNCTURE (N/A)     Discharge Exam:  Constitutional:       Appearance: Normal appearance.   HENT:      Nose:      Comments: No drainage      Mouth/Throat:      Pharynx: Oropharynx is clear.   Eyes:      Extraocular Movements: Extraocular movements intact.   Neurological:      Mental Status: He is alert.     Consults:   Consults (From admission, onward)          Status Ordering Provider     Inpatient consult to Registered Dietitian/Nutritionist  Once        Provider:  (Not yet assigned)    Completed TIFFANY GRAHAM consult to case management/social work  Once        Provider:  (Not yet assigned)    Acknowledged PIETRO BAEZ            Significant Diagnostic Studies: None    Pending Diagnostic Studies:       Procedure Component Value Units Date/Time    Specimen to Pathology, Surgery ENT [[id number]] Collected: 12/16/22 1825    Order Status: Sent Lab Status: In process Updated: 12/19/22 1244    Specimen: Tissue           Final Active Diagnoses:    Diagnosis Date Noted POA    PRINCIPAL PROBLEM:  Postoperative CSF leak  [G97.82, G96.00] 12/16/2022 Yes    Status post functional endoscopic sinus surgery (FESS) [Z98.890] 12/17/2022 Not Applicable    Coronary artery atheroma [I25.10] 12/16/2022 Yes    Primary hypertension [I10] 12/16/2022 Yes    Acquired hypothyroidism [E03.9] 12/16/2022 Yes    Urinary retention [R33.9] 12/16/2022 Yes      Problems Resolved During this Admission:      Discharged Condition: good    Disposition: Home or Self Care    Follow Up:   Follow-up Information       Paulino Zhu MD Follow up on 12/27/2022.    Specialty: Otolaryngology  Contact information:  [address] [facility] [address]  [phone]                           Patient Instructions:      Diet Adult Regular     Lifting restrictions   Order Comments: See discharge instructions     Notify your health care provider if you experience any of the following:  temperature >100.4     Notify your health care provider if you experience any of the following:  persistent nausea and vomiting or diarrhea     Notify your health care provider if you experience any of the following:  severe uncontrolled pain     Medications:  Reconciled Home Medications:      Medication List        START taking these medications      acetaminophen 325 MG tablet  Commonly known as: TYLENOL  Take 2 tablets (650 mg total) by mouth every 6 (six) hours as needed for Pain (Alternate with ibuprofen).     amoxicillin-clavulanate 500-125mg 500-125 mg Tab  Commonly known as: AUGMENTIN  Take 1 tablet (500 mg total) by mouth 2 (two) times daily. for 5 days     ibuprofen 800 MG tablet  Commonly known as: ADVIL,MOTRIN  Take 1 tablet (800 mg total) by mouth every 6 (six) hours as needed for Pain (Alternate with tylenol).     ondansetron 4 MG tablet  Commonly known as: ZOFRAN  Take 1 tablet (4 mg total) by mouth every 6 (six) hours as needed for Nausea.     polyethylene glycol 17 gram Pwpk  Commonly known as: GLYCOLAX  Take 17 g by mouth once daily.     sodium chloride 0.65 % nasal  spray  Commonly known as: OCEAN  2 sprays by Nasal route 3 (three) times daily.            CONTINUE taking these medications      aspirin 81 MG EC tablet  Commonly known as: ECOTRIN  Take 81 mg by mouth.     cefUROXime 500 MG tablet  Commonly known as: CEFTIN  Take 500 mg by mouth 2 (two) times daily.     ezetimibe 10 mg tablet  Commonly known as: ZETIA  Take 5 mg by mouth every evening.     finasteride 5 mg tablet  Commonly known as: PROSCAR  Take 5 mg by mouth every morning.     gabapentin 300 MG capsule  Commonly known as: NEURONTIN  Take 300 mg by mouth 3 (three) times daily as needed.     levothyroxine 50 MCG tablet  Commonly known as: SYNTHROID  Take 50 mcg by mouth before breakfast.     meloxicam 15 MG tablet  Commonly known as: MOBIC  Take 15 mg by mouth.     QUEtiapine 25 MG Tab  Commonly known as: SEROQUEL  Take 18.25 mg by mouth every evening.     rosuvastatin 20 MG tablet  Commonly known as: CRESTOR  Take 20 mg by mouth every evening.     tadalafiL 5 MG tablet  Commonly known as: CIALIS  Take 5 mg by mouth once daily.     tamsulosin 0.4 mg Cap  Commonly known as: FLOMAX  Take 0.4 mg by mouth every morning.     telmisartan 20 MG Tab  Commonly known as: MICARDIS  Take by mouth every evening.     tiZANidine 4 mg Cap  Take 4 mg by mouth daily as needed.     zolpidem 12.5 MG CR tablet  Commonly known as: AMBIEN CR  Take 12.5 mg by mouth nightly as needed for Insomnia.              Lisa Cherry MD  Otorhinolaryngology-Head & Neck Surgery  [facility] - Neurosurgery [facility])

## 2022-12-27 ENCOUNTER — OFFICE VISIT (OUTPATIENT)
Dept: OTOLARYNGOLOGY | Facility: CLINIC | Age: 67
End: 2022-12-27
Payer: MEDICARE

## 2022-12-27 VITALS
SYSTOLIC BLOOD PRESSURE: 120 MMHG | DIASTOLIC BLOOD PRESSURE: 77 MMHG | BODY MASS INDEX: 29.85 KG/M2 | WEIGHT: 202.19 LBS | HEART RATE: 89 BPM

## 2022-12-27 DIAGNOSIS — G96.00 POSTOPERATIVE CSF LEAK: Primary | ICD-10-CM

## 2022-12-27 DIAGNOSIS — G97.82 POSTOPERATIVE CSF LEAK: Primary | ICD-10-CM

## 2022-12-27 DIAGNOSIS — J32.9 CHRONIC SINUSITIS, UNSPECIFIED LOCATION: ICD-10-CM

## 2022-12-27 PROCEDURE — 99999 PR PBB SHADOW E&M-EST. PATIENT-LVL III: CPT | Mod: PBBFAC,,, | Performed by: STUDENT IN AN ORGANIZED HEALTH CARE EDUCATION/TRAINING PROGRAM

## 2022-12-27 PROCEDURE — 99213 OFFICE O/P EST LOW 20 MIN: CPT | Mod: 25,S$PBB,, | Performed by: STUDENT IN AN ORGANIZED HEALTH CARE EDUCATION/TRAINING PROGRAM

## 2022-12-27 PROCEDURE — 99999 PR PBB SHADOW E&M-EST. PATIENT-LVL III: ICD-10-PCS | Mod: PBBFAC,,, | Performed by: STUDENT IN AN ORGANIZED HEALTH CARE EDUCATION/TRAINING PROGRAM

## 2022-12-27 PROCEDURE — 31237 NSL/SINS NDSC SURG BX POLYPC: CPT | Mod: PBBFAC,50 | Performed by: STUDENT IN AN ORGANIZED HEALTH CARE EDUCATION/TRAINING PROGRAM

## 2022-12-27 PROCEDURE — 31237 NSL/SINS NDSC SURG BX POLYPC: CPT | Mod: 50,S$PBB,, | Performed by: STUDENT IN AN ORGANIZED HEALTH CARE EDUCATION/TRAINING PROGRAM

## 2022-12-27 PROCEDURE — 99213 OFFICE O/P EST LOW 20 MIN: CPT | Mod: PBBFAC,25 | Performed by: STUDENT IN AN ORGANIZED HEALTH CARE EDUCATION/TRAINING PROGRAM

## 2022-12-27 PROCEDURE — 99213 PR OFFICE/OUTPT VISIT, EST, LEVL III, 20-29 MIN: ICD-10-PCS | Mod: 25,S$PBB,, | Performed by: STUDENT IN AN ORGANIZED HEALTH CARE EDUCATION/TRAINING PROGRAM

## 2022-12-27 PROCEDURE — 31237 PR NASAL/SINUS ENDOSCOPY,BX/RMV POLYP/DEBRID: ICD-10-PCS | Mod: 50,S$PBB,, | Performed by: STUDENT IN AN ORGANIZED HEALTH CARE EDUCATION/TRAINING PROGRAM

## 2022-12-27 NOTE — PROGRESS NOTES
POSTOP ESS       Subjective:      Sarabjit is a 67 y.o. male who comes for follow-up of  an iatrogenic CSF leak .  He is s/p endoscopic repair on 12/16/22 with left sided MT flap. Doing well. No report of nasal drainage or clear rhinorrhea. Some left sided nasal congestion.     His current sinus regime consists of: Nasal saline.    The assessment of quality and severity of symptoms as measured by the SNOT-22 score is 51.    The patient's medications, allergies, past medical, surgical, social and family histories were reviewed and updated as appropriate.    A detailed review of systems was obtained with pertinent positives as per the above HPI, and otherwise negative.        Objective:     /77   Pulse 89   Wt 91.7 kg (202 lb 2.6 oz)   BMI 29.85 kg/m²        Constitutional:   Vital signs are normal. He appears well-developed and well-nourished.     Head:  Normocephalic and atraumatic.     Ears:  Hearing normal to normal and whispered voice; external ear normal without scars, lesions, or masses; ear canal, tympanic membrane, and middle ear normal..   Right Ear: No swelling. Tympanic membrane is not perforated and not bulging. No middle ear effusion.   Left Ear: No swelling. Tympanic membrane is not perforated and not bulging.  No middle ear effusion.     Nose:  Nose normal including turbinates, nasal mucosa, sinuses and nasal septum. No epistaxis.     Mouth/Throat  Oropharynx clear and moist without lesions or asymmetry and normal uvula midline. Normal dentition. No tonsillar abscesses. Tonsillar exudate.      Neck:  Neck normal without thyromegaly masses, asymmetry, normal tracheal structure, crepitus, and tenderness, thyroid normal, trachea normal, phonation normal, full range of motion with neck supple and no adenopathy. No stridor present.        Head (right side): No submental adenopathy present.        Head (left side): No submental adenopathy present.     He has no cervical adenopathy.      Pulmonary/Chest:   No stridor.     Procedure    Nasal endoscopy with debridement performed.  See procedure note.    Nasal Endoscopy with Debridement, bilateral.      The nasal passageway was anesthetized with topical Lidocaine 4% and decongested with phenylephrine nasal spray. A rigid nasal endoscope was inserted into the nose to visualize the nasal passageway and paranasal sinuses. Under endoscopic visualization, a combination of instruments including suction and grasping forceps were used to debride crust, debris, inflammatory tissue and nasal polyps from the nasal cavity, paranasal sinuses and sinus drainage pathways, this was performed bilaterally. This was performed to aid in healing and optimize the patency and function of the sinus cavities and nasal passageways. This is necessary to avoid scar formation, infection, and mucocele formation. In addition, this facilitates in the optimal instillation of topical therapies, saline irrigations, long-term disease surveillance, and endoscopically-derived cultures. The endoscope was withdrawn without sequelae. The procedure was well tolerated by the patient.    Left skull base repair healthy. MT flap with good position along the cribriform.     Data Reviewed    WBC (K/uL)   Date Value   12/21/2022 7.30     Eosinophil % (%)   Date Value   12/21/2022 4.1     Eos # (K/uL)   Date Value   12/21/2022 0.3     Platelets (K/uL)   Date Value   12/21/2022 245     Glucose (mg/dL)   Date Value   12/21/2022 93   12/21/2022 93     No results found for: IGE    No sinus imaging available.      Assessment:     1. Postoperative CSF leak    2. Chronic sinusitis, unspecified location      Plan:     - Nasal saline  - No nose blowing  - RTC 3 weeks    Paulino Zhu MD

## 2022-12-29 LAB
FINAL PATHOLOGIC DIAGNOSIS: NORMAL
GROSS: NORMAL
Lab: NORMAL

## 2023-01-06 ENCOUNTER — TELEPHONE (OUTPATIENT)
Dept: OTOLARYNGOLOGY | Facility: CLINIC | Age: 68
End: 2023-01-06
Payer: MEDICARE

## 2023-01-20 ENCOUNTER — OFFICE VISIT (OUTPATIENT)
Dept: OTOLARYNGOLOGY | Facility: CLINIC | Age: 68
End: 2023-01-20
Payer: MEDICARE

## 2023-01-20 VITALS
DIASTOLIC BLOOD PRESSURE: 78 MMHG | SYSTOLIC BLOOD PRESSURE: 116 MMHG | HEART RATE: 71 BPM | WEIGHT: 202.63 LBS | BODY MASS INDEX: 29.92 KG/M2

## 2023-01-20 DIAGNOSIS — J32.9 CHRONIC SINUSITIS, UNSPECIFIED LOCATION: ICD-10-CM

## 2023-01-20 DIAGNOSIS — G97.82 POSTOPERATIVE CSF LEAK: Primary | ICD-10-CM

## 2023-01-20 DIAGNOSIS — G96.00 POSTOPERATIVE CSF LEAK: Primary | ICD-10-CM

## 2023-01-20 PROCEDURE — 99999 PR PBB SHADOW E&M-EST. PATIENT-LVL IV: CPT | Mod: PBBFAC,,, | Performed by: STUDENT IN AN ORGANIZED HEALTH CARE EDUCATION/TRAINING PROGRAM

## 2023-01-20 PROCEDURE — 99213 OFFICE O/P EST LOW 20 MIN: CPT | Mod: 25,S$PBB,, | Performed by: STUDENT IN AN ORGANIZED HEALTH CARE EDUCATION/TRAINING PROGRAM

## 2023-01-20 PROCEDURE — 99214 OFFICE O/P EST MOD 30 MIN: CPT | Mod: PBBFAC | Performed by: STUDENT IN AN ORGANIZED HEALTH CARE EDUCATION/TRAINING PROGRAM

## 2023-01-20 PROCEDURE — 99213 PR OFFICE/OUTPT VISIT, EST, LEVL III, 20-29 MIN: ICD-10-PCS | Mod: 25,S$PBB,, | Performed by: STUDENT IN AN ORGANIZED HEALTH CARE EDUCATION/TRAINING PROGRAM

## 2023-01-20 PROCEDURE — 31231 NASAL ENDOSCOPY DX: CPT | Mod: S$PBB,,, | Performed by: STUDENT IN AN ORGANIZED HEALTH CARE EDUCATION/TRAINING PROGRAM

## 2023-01-20 PROCEDURE — 99999 PR PBB SHADOW E&M-EST. PATIENT-LVL IV: ICD-10-PCS | Mod: PBBFAC,,, | Performed by: STUDENT IN AN ORGANIZED HEALTH CARE EDUCATION/TRAINING PROGRAM

## 2023-01-20 PROCEDURE — 31231 NASAL ENDOSCOPY DX: CPT | Mod: PBBFAC | Performed by: STUDENT IN AN ORGANIZED HEALTH CARE EDUCATION/TRAINING PROGRAM

## 2023-01-20 PROCEDURE — 31231 PR NASAL ENDOSCOPY, DX: ICD-10-PCS | Mod: S$PBB,,, | Performed by: STUDENT IN AN ORGANIZED HEALTH CARE EDUCATION/TRAINING PROGRAM

## 2023-01-20 NOTE — PROGRESS NOTES
POSTOP ESS 2    Subjective:      Sarabjit is a 67 y.o. male who comes for follow-up of  an iatrogenic CSF leak .  He is s/p endoscopic repair on 12/16/22 with left sided MT flap.     No further rhinorrhea. Breathing through nose is improved.     His current sinus regime consists of: Nasal saline.    The assessment of quality and severity of symptoms as measured by the SNOT-22 score was deferred today.    The patient's medications, allergies, past medical, surgical, social and family histories were reviewed and updated as appropriate.    A detailed review of systems was obtained with pertinent positives as per the above HPI, and otherwise negative.    Objective:     /78 (BP Location: Left arm, Patient Position: Sitting)   Pulse 71   Wt 91.9 kg (202 lb 9.6 oz)   BMI 29.92 kg/m²        Constitutional:   Vital signs are normal. He appears well-developed and well-nourished.     Head:  Normocephalic and atraumatic.     Ears:  Hearing normal to normal and whispered voice; external ear normal without scars, lesions, or masses; ear canal, tympanic membrane, and middle ear normal..   Right Ear: No swelling. Tympanic membrane is not perforated and not bulging. No middle ear effusion.   Left Ear: No swelling. Tympanic membrane is not perforated and not bulging.  No middle ear effusion.     Nose:  Nose normal including turbinates, nasal mucosa, sinuses and nasal septum. No epistaxis.     Mouth/Throat  Oropharynx clear and moist without lesions or asymmetry and normal uvula midline. Normal dentition. No tonsillar abscesses. Tonsillar exudate.      Neck:  Neck normal without thyromegaly masses, asymmetry, normal tracheal structure, crepitus, and tenderness, thyroid normal, trachea normal, phonation normal, full range of motion with neck supple and no adenopathy. No stridor present.        Head (right side): No submental adenopathy present.        Head (left side): No submental adenopathy present.     He has no cervical  adenopathy.     Pulmonary/Chest:   No stridor.     Procedure    Nasal endoscopy performed.  See procedure note.    Nasal Endoscopy:  1/20/2023    The use of diagnostic nasal endoscopy was considered medically necessary for the evaluation and visualization of the nasal anatomy for symptoms suggestive of nasal or sinus origin. Physical examination (including a nasal speculum evaluation) did not provide sufficient clinical information to establish a diagnosis, or symptoms did not improve or worsened following treatment.     The nasal cavity was decongested with topical 1% phenylephrine and anesthetized with 4% lidocaine.  A rigid 0-degree endoscope was introduced into the nasal cavity.    The patient was seated in the examination chair. After discussion of risks and benefits, a nasal endoscope was inserted into the nose the endoscope was passed along the left nasal floor to the nasopharynx. It was then passed between the middle and superior meatus, nasal turbinates, nasal septum, nasopharynx and sphenoethmoid region. The nasal endoscope was withdrawn and there was no complications. An identical procedure was performed on the right side. I was present for the entire procedure.The patient tolerated the above procedure well. The findings of this procedure can be found in the dictated note from 1/20/2023 visit.                                Left skull base repair healthy. MT flap with good position along the cribriform.     Data Reviewed    WBC (K/uL)   Date Value   12/21/2022 7.30     Eosinophil % (%)   Date Value   12/21/2022 4.1     Eos # (K/uL)   Date Value   12/21/2022 0.3     Platelets (K/uL)   Date Value   12/21/2022 245     Glucose (mg/dL)   Date Value   12/21/2022 93   12/21/2022 93     No results found for: IGE    No sinus imaging available.      Assessment:     1. Postoperative CSF leak    2. Chronic sinusitis, unspecified location        Plan:     - Cont nasal saline  - Ok ro restart antihistamines  - OK to  blow nose gently   - RTC 3 months    Paulino Zhu MD

## 2023-05-16 ENCOUNTER — OFFICE VISIT (OUTPATIENT)
Dept: OTOLARYNGOLOGY | Facility: CLINIC | Age: 68
End: 2023-05-16
Payer: MEDICARE

## 2023-05-16 VITALS
SYSTOLIC BLOOD PRESSURE: 118 MMHG | HEART RATE: 76 BPM | DIASTOLIC BLOOD PRESSURE: 76 MMHG | BODY MASS INDEX: 29.74 KG/M2 | WEIGHT: 200.81 LBS | HEIGHT: 69 IN

## 2023-05-16 DIAGNOSIS — G97.82 POSTOPERATIVE CSF LEAK: Primary | ICD-10-CM

## 2023-05-16 DIAGNOSIS — J32.9 CHRONIC SINUSITIS, UNSPECIFIED LOCATION: ICD-10-CM

## 2023-05-16 DIAGNOSIS — G96.00 POSTOPERATIVE CSF LEAK: Primary | ICD-10-CM

## 2023-05-16 PROCEDURE — 31231 NASAL ENDOSCOPY DX: CPT | Mod: S$PBB,,, | Performed by: STUDENT IN AN ORGANIZED HEALTH CARE EDUCATION/TRAINING PROGRAM

## 2023-05-16 PROCEDURE — 99214 OFFICE O/P EST MOD 30 MIN: CPT | Mod: PBBFAC,25 | Performed by: STUDENT IN AN ORGANIZED HEALTH CARE EDUCATION/TRAINING PROGRAM

## 2023-05-16 PROCEDURE — 99999 PR PBB SHADOW E&M-EST. PATIENT-LVL IV: ICD-10-PCS | Mod: PBBFAC,,, | Performed by: STUDENT IN AN ORGANIZED HEALTH CARE EDUCATION/TRAINING PROGRAM

## 2023-05-16 PROCEDURE — 31231 NASAL ENDOSCOPY DX: CPT | Mod: PBBFAC | Performed by: STUDENT IN AN ORGANIZED HEALTH CARE EDUCATION/TRAINING PROGRAM

## 2023-05-16 PROCEDURE — 31231 PR NASAL ENDOSCOPY, DX: ICD-10-PCS | Mod: S$PBB,,, | Performed by: STUDENT IN AN ORGANIZED HEALTH CARE EDUCATION/TRAINING PROGRAM

## 2023-05-16 PROCEDURE — 99999 PR PBB SHADOW E&M-EST. PATIENT-LVL IV: CPT | Mod: PBBFAC,,, | Performed by: STUDENT IN AN ORGANIZED HEALTH CARE EDUCATION/TRAINING PROGRAM

## 2023-05-16 PROCEDURE — 99213 OFFICE O/P EST LOW 20 MIN: CPT | Mod: 25,S$PBB,, | Performed by: STUDENT IN AN ORGANIZED HEALTH CARE EDUCATION/TRAINING PROGRAM

## 2023-05-16 PROCEDURE — 99213 PR OFFICE/OUTPT VISIT, EST, LEVL III, 20-29 MIN: ICD-10-PCS | Mod: 25,S$PBB,, | Performed by: STUDENT IN AN ORGANIZED HEALTH CARE EDUCATION/TRAINING PROGRAM

## 2023-05-16 NOTE — PROGRESS NOTES
"    Subjective:      Sarabjit is a 67 y.o. male who comes for follow-up of  iatrogenic CSF leak .  His last visit with me was on 1/20/2023.   He is s/p endoscopic repair on 12/16/22 with left sided MT flap. Doing well. No rhinorrhea. Has been in [place] and [place] for work over the last 2 months. Has had minimal allergic symptoms.     His current sinus regime consists of: Nasal saline.    The patient's medications, allergies, past medical, surgical, social and family histories were reviewed and updated as appropriate.    A detailed review of systems was obtained with pertinent positives as per the above HPI, and otherwise negative.        Objective:     /76 (BP Location: Left arm, Patient Position: Sitting, BP Method: Large (Automatic))   Pulse 76   Ht 5' 9" (1.753 m)   Wt 91.1 kg (200 lb 13.4 oz)   BMI 29.66 kg/m²        Constitutional:   Vital signs are normal. He appears well-developed and well-nourished.     Head:  Normocephalic and atraumatic.     Ears:  Hearing normal to normal and whispered voice; external ear normal without scars, lesions, or masses; ear canal, tympanic membrane, and middle ear normal..   Right Ear: No swelling. Tympanic membrane is not perforated and not bulging. No middle ear effusion.   Left Ear: No swelling. Tympanic membrane is not perforated and not bulging.  No middle ear effusion.     Nose:  Nose normal including turbinates, nasal mucosa, sinuses and nasal septum. No epistaxis.     Mouth/Throat  Oropharynx clear and moist without lesions or asymmetry and normal uvula midline. Normal dentition. No tonsillar abscesses. Tonsillar exudate.      Neck:  Neck normal without thyromegaly masses, asymmetry, normal tracheal structure, crepitus, and tenderness, thyroid normal, trachea normal, phonation normal, full range of motion with neck supple and no adenopathy. No stridor present.        Head (right side): No submental adenopathy present.        Head (left side): No submental adenopathy " present.     He has no cervical adenopathy.     Pulmonary/Chest:   No stridor.     Procedure    Nasal endoscopy performed.  See procedure note.    Nasal Endoscopy:  5/16/2023    The use of diagnostic nasal endoscopy was considered medically necessary for the evaluation and visualization of the nasal anatomy for symptoms suggestive of nasal or sinus origin. Physical examination (including a nasal speculum evaluation) did not provide sufficient clinical information to establish a diagnosis, or symptoms did not improve or worsened following treatment.     The nasal cavity was decongested with topical 1% phenylephrine and anesthetized with 4% lidocaine.  A rigid 0-degree endoscope was introduced into the nasal cavity.    The patient was seated in the examination chair. After discussion of risks and benefits, a nasal endoscope was inserted into the nose the endoscope was passed along the left nasal floor to the nasopharynx. It was then passed between the middle and superior meatus, nasal turbinates, nasal septum, nasopharynx and sphenoethmoid region. The nasal endoscope was withdrawn and there was no complications. An identical procedure was performed on the right side. I was present for the entire procedure.The patient tolerated the above procedure well. The findings of this procedure can be found in the dictated note from 5/16/2023 visit.                              Completely healed skull base defect. MT flap nicely opposed to cribriform.     Data Reviewed    WBC (K/uL)   Date Value   12/21/2022 7.30     Eosinophil % (%)   Date Value   12/21/2022 4.1     Eos # (K/uL)   Date Value   12/21/2022 0.3     Platelets (K/uL)   Date Value   12/21/2022 245     Glucose (mg/dL)   Date Value   12/21/2022 93   12/21/2022 93     No results found for: IGE    No sinus imaging available.      Assessment:     1. Postoperative CSF leak    2. Chronic sinusitis, unspecified location         Plan:     - Doing well  - RTC 6 months    Paulino  LAUREN Zhu MD

## 2023-11-17 ENCOUNTER — OFFICE VISIT (OUTPATIENT)
Dept: OTOLARYNGOLOGY | Facility: CLINIC | Age: 68
End: 2023-11-17
Payer: MEDICARE

## 2023-11-17 VITALS
WEIGHT: 201.5 LBS | DIASTOLIC BLOOD PRESSURE: 87 MMHG | SYSTOLIC BLOOD PRESSURE: 144 MMHG | HEIGHT: 69 IN | BODY MASS INDEX: 29.84 KG/M2 | HEART RATE: 71 BPM

## 2023-11-17 DIAGNOSIS — J32.9 CHRONIC SINUSITIS, UNSPECIFIED LOCATION: ICD-10-CM

## 2023-11-17 DIAGNOSIS — R04.0 EPISTAXIS: ICD-10-CM

## 2023-11-17 DIAGNOSIS — G97.82 POSTOPERATIVE CSF LEAK: Primary | ICD-10-CM

## 2023-11-17 DIAGNOSIS — G96.00 POSTOPERATIVE CSF LEAK: Primary | ICD-10-CM

## 2023-11-17 PROCEDURE — 99213 PR OFFICE/OUTPT VISIT, EST, LEVL III, 20-29 MIN: ICD-10-PCS | Mod: 25,S$PBB,, | Performed by: STUDENT IN AN ORGANIZED HEALTH CARE EDUCATION/TRAINING PROGRAM

## 2023-11-17 PROCEDURE — 99214 OFFICE O/P EST MOD 30 MIN: CPT | Mod: PBBFAC | Performed by: STUDENT IN AN ORGANIZED HEALTH CARE EDUCATION/TRAINING PROGRAM

## 2023-11-17 PROCEDURE — 99213 OFFICE O/P EST LOW 20 MIN: CPT | Mod: 25,S$PBB,, | Performed by: STUDENT IN AN ORGANIZED HEALTH CARE EDUCATION/TRAINING PROGRAM

## 2023-11-17 PROCEDURE — 99999 PR PBB SHADOW E&M-EST. PATIENT-LVL IV: ICD-10-PCS | Mod: PBBFAC,,, | Performed by: STUDENT IN AN ORGANIZED HEALTH CARE EDUCATION/TRAINING PROGRAM

## 2023-11-17 PROCEDURE — 99999 PR PBB SHADOW E&M-EST. PATIENT-LVL IV: CPT | Mod: PBBFAC,,, | Performed by: STUDENT IN AN ORGANIZED HEALTH CARE EDUCATION/TRAINING PROGRAM

## 2023-11-17 RX ORDER — PROGESTERONE 100 MG/1
60 CAPSULE ORAL
COMMUNITY

## 2023-11-17 RX ORDER — OFLOXACIN 3 MG/ML
SOLUTION/ DROPS OPHTHALMIC
COMMUNITY
Start: 2023-10-05

## 2023-11-17 NOTE — PROGRESS NOTES
"      Subjective:      Sarabjit is a 68 y.o. male who comes for follow-up of  iatrogenic CSF leak .  His last visit with me was on 5/16/2023.   He is s/p endoscopic repair on 12/16/22 with left sided MT flap.     Has been having more nosebleeds over the last couple of months, says they are mostly left sided. No clear nasa draiange. Will still get occasional sinus infection.     His current sinus regime consists of: Nasal saline.    The patient's medications, allergies, past medical, surgical, social and family histories were reviewed and updated as appropriate.    A detailed review of systems was obtained with pertinent positives as per the above HPI, and otherwise negative.        Objective:     BP (!) 144/87 (BP Location: Left arm, Patient Position: Sitting, BP Method: Large (Automatic))   Pulse 71   Ht 5' 9" (1.753 m)   Wt 91.4 kg (201 lb 8 oz)   BMI 29.76 kg/m²        Constitutional:   Vital signs are normal. He appears well-developed and well-nourished.     Head:  Normocephalic and atraumatic.     Ears:  Hearing normal to normal and whispered voice; external ear normal without scars, lesions, or masses; ear canal, tympanic membrane, and middle ear normal..   Right Ear: No swelling. Tympanic membrane is not perforated and not bulging. No middle ear effusion.   Left Ear: No swelling. Tympanic membrane is not perforated and not bulging.  No middle ear effusion.     Nose:  Nose normal including turbinates, nasal mucosa, sinuses and nasal septum. No epistaxis.     Mouth/Throat  Oropharynx clear and moist without lesions or asymmetry and normal uvula midline. Normal dentition. No tonsillar abscesses. Tonsillar exudate.      Neck:  Neck normal without thyromegaly masses, asymmetry, normal tracheal structure, crepitus, and tenderness, thyroid normal, trachea normal, phonation normal, full range of motion with neck supple and no adenopathy. No stridor present.        Head (right side): No submental adenopathy " "present.        Head (left side): No submental adenopathy present.     He has no cervical adenopathy.     Pulmonary/Chest:   No stridor.       Procedure    Nasal endoscopy performed.  See procedure note.    Nasal Endoscopy:  11/17/2023    The use of diagnostic nasal endoscopy was considered medically necessary for the evaluation and visualization of the nasal anatomy for symptoms suggestive of nasal or sinus origin. Physical examination (including a nasal speculum evaluation) did not provide sufficient clinical information to establish a diagnosis, or symptoms did not improve or worsened following treatment.     The nasal cavity was decongested with topical 1% phenylephrine and anesthetized with 4% lidocaine.  A rigid 0-degree endoscope was introduced into the nasal cavity.    The patient was seated in the examination chair. After discussion of risks and benefits, a nasal endoscope was inserted into the nose the endoscope was passed along the left nasal floor to the nasopharynx. It was then passed between the middle and superior meatus, nasal turbinates, nasal septum, nasopharynx and sphenoethmoid region. The nasal endoscope was withdrawn and there was no complications. An identical procedure was performed on the right side. I was present for the entire procedure.The patient tolerated the above procedure well. The findings of this procedure can be found in the dictated note from 11/17/2023 visit.                                          Patient ethmoids bilaterally. No obvious areas along the septum for etiology of epistaxis. Well healed skull base defect. MT flap nicely opposed to cribriform.     Data Reviewed    WBC (K/uL)   Date Value   12/21/2022 7.30     Eosinophil % (%)   Date Value   12/21/2022 4.1     Eos # (K/uL)   Date Value   12/21/2022 0.3     Platelets (K/uL)   Date Value   12/21/2022 245     Glucose (mg/dL)   Date Value   12/21/2022 93   12/21/2022 93     No results found for: "IGE"    No sinus imaging " available.    Assessment:     1. Postoperative CSF leak    2. Chronic sinusitis, unspecified location    3. Epistaxis         Plan:     - Plan to change from nasal sprays to irrigations.   - Budesonide/astelin irrigations.  - RTC 6 months or sooner if bleeding recurs.    Paulino Zhu MD

## 2023-11-20 ENCOUNTER — PATIENT MESSAGE (OUTPATIENT)
Dept: OTOLARYNGOLOGY | Facility: CLINIC | Age: 68
End: 2023-11-20
Payer: MEDICARE

## 2024-05-17 ENCOUNTER — OFFICE VISIT (OUTPATIENT)
Dept: OTOLARYNGOLOGY | Facility: CLINIC | Age: 69
End: 2024-05-17
Payer: MEDICARE

## 2024-05-17 VITALS
SYSTOLIC BLOOD PRESSURE: 129 MMHG | DIASTOLIC BLOOD PRESSURE: 81 MMHG | HEIGHT: 69 IN | HEART RATE: 73 BPM | BODY MASS INDEX: 30.36 KG/M2 | WEIGHT: 205 LBS

## 2024-05-17 DIAGNOSIS — G96.00 POSTOPERATIVE CSF LEAK: Primary | ICD-10-CM

## 2024-05-17 DIAGNOSIS — G97.82 POSTOPERATIVE CSF LEAK: Primary | ICD-10-CM

## 2024-05-17 DIAGNOSIS — J34.2 NASAL SEPTAL DEVIATION: ICD-10-CM

## 2024-05-17 DIAGNOSIS — J32.9 CHRONIC SINUSITIS, UNSPECIFIED LOCATION: ICD-10-CM

## 2024-05-17 PROCEDURE — 31231 NASAL ENDOSCOPY DX: CPT | Mod: S$PBB,,, | Performed by: STUDENT IN AN ORGANIZED HEALTH CARE EDUCATION/TRAINING PROGRAM

## 2024-05-17 PROCEDURE — 99999 PR PBB SHADOW E&M-EST. PATIENT-LVL IV: CPT | Mod: PBBFAC,,, | Performed by: STUDENT IN AN ORGANIZED HEALTH CARE EDUCATION/TRAINING PROGRAM

## 2024-05-17 PROCEDURE — 31231 NASAL ENDOSCOPY DX: CPT | Mod: PBBFAC | Performed by: STUDENT IN AN ORGANIZED HEALTH CARE EDUCATION/TRAINING PROGRAM

## 2024-05-17 PROCEDURE — 99214 OFFICE O/P EST MOD 30 MIN: CPT | Mod: PBBFAC | Performed by: STUDENT IN AN ORGANIZED HEALTH CARE EDUCATION/TRAINING PROGRAM

## 2024-05-17 PROCEDURE — 99213 OFFICE O/P EST LOW 20 MIN: CPT | Mod: 25,S$PBB,, | Performed by: STUDENT IN AN ORGANIZED HEALTH CARE EDUCATION/TRAINING PROGRAM

## 2024-05-17 RX ORDER — ANASTROZOLE 1 MG/1
1 TABLET ORAL
COMMUNITY

## 2024-05-17 RX ORDER — CRANBERRY FRUIT EXTRACT 650 MG
15 CAPSULE ORAL
COMMUNITY

## 2024-05-17 NOTE — PROGRESS NOTES
"    Subjective:      Sarabjit is a 68 y.o. male who comes for follow-up of  his CSF leak .  His last visit with me was on 11/17/2023.  He reports some clear nasal drainage that is coming from his nose episodically that resolved after blowing his nose.  He denies any postnasal drip, salty or metallic taste in the back of his throat, or coughing.  He does use sinus irrigations occasionally.  Denies any headaches, fever, vision changes or facial pressure.  He was no purulent nasal drainage.    His current sinus regime consists of: Saline irrigations.     The assessment of quality and severity of symptoms as measured by the SNOT-22 score is deferred.    The patient's medications, allergies, past medical, surgical, social and family histories were reviewed and updated as appropriate.    ROS     A detailed review of systems was obtained with pertinent positives as per the above HPI, and otherwise negative.        Objective:     /81 (BP Location: Left arm, Patient Position: Sitting, BP Method: Large (Automatic))   Pulse 73   Ht 5' 9" (1.753 m)   Wt 93 kg (205 lb 0.4 oz)   BMI 30.28 kg/m²        Constitutional:   Vital signs are normal. He appears well-developed and well-nourished.     Head:  Normocephalic and atraumatic.     Ears:  Hearing normal to normal and whispered voice; external ear normal without scars, lesions, or masses; ear canal, tympanic membrane, and middle ear normal..   Right Ear: No swelling. Tympanic membrane is not perforated and not bulging. No middle ear effusion.   Left Ear: No swelling. Tympanic membrane is not perforated and not bulging.  No middle ear effusion.     Nose:  Nose normal including turbinates, nasal mucosa, sinuses and nasal septum. No epistaxis.     Mouth/Throat  Oropharynx clear and moist without lesions or asymmetry and normal uvula midline. Normal dentition. No tonsillar abscesses. Tonsillar exudate.      Neck:  Neck normal without thyromegaly masses, asymmetry, normal " tracheal structure, crepitus, and tenderness, thyroid normal, trachea normal, phonation normal, full range of motion with neck supple and no adenopathy. No stridor present.        Head (right side): No submental adenopathy present.        Head (left side): No submental adenopathy present.     He has no cervical adenopathy.     Pulmonary/Chest:   No stridor.       Procedure    Nasal endoscopy performed.  See procedure note.    Nasal Endoscopy:  5/17/2024    The use of diagnostic nasal endoscopy was considered medically necessary for the evaluation and visualization of the nasal anatomy for symptoms suggestive of nasal or sinus origin. Physical examination (including a nasal speculum evaluation) did not provide sufficient clinical information to establish a diagnosis, or symptoms did not improve or worsened following treatment.     The nasal cavity was decongested with topical 1% phenylephrine and anesthetized with 4% lidocaine.  A rigid 0-degree endoscope was introduced into the nasal cavity.    The patient was seated in the examination chair. After discussion of risks and benefits, a nasal endoscope was inserted into the nose the endoscope was passed along the left nasal floor to the nasopharynx. It was then passed between the middle and superior meatus, nasal turbinates, nasal septum, nasopharynx and sphenoethmoid region. The nasal endoscope was withdrawn and there was no complications. An identical procedure was performed on the right side. I was present for the entire procedure.The patient tolerated the above procedure well. The findings of this procedure can be found in the dictated note from 5/17/2024 visit.                                      Well-healed skull-base reconstruction noted in the left cribriform plate.  Middle turbinate flap were apposed against skull base.  No evidence of CSF aggression from his skull-base on either side.  Patent sinuses bilaterally.  No nasal masses or concerning  "polyposis.    Data Reviewed    WBC (K/uL)   Date Value   12/21/2022 7.30     Eosinophil % (%)   Date Value   12/21/2022 4.1     Eos # (K/uL)   Date Value   12/21/2022 0.3     Platelets (K/uL)   Date Value   12/21/2022 245     Glucose (mg/dL)   Date Value   12/21/2022 93   12/21/2022 93     No results found for: "IGE"    No sinus imaging available.      Assessment:     1. Postoperative CSF leak    2. Chronic sinusitis, unspecified location    3. Nasal septal deviation         Plan:     - low concern for CSF leak.  - likely allergies given his history.  - we will be over the courses and provide patient with samples for fluid collection to assess for beta 2 transferrin.  - return to clinic in 3 months.    Paulino Zhu MD     "

## 2024-05-17 NOTE — PATIENT INSTRUCTIONS
How To Collect Fluid for Cerebrospinal Fluid (CFS) Analysis    Patient instructions are as follows:     You will be given a small sterile plastic cup with an orange top, a lab order, ice pack and a blue specimen bag.  Please use the small sterile plastic cup with an orange top to collect nasal fluid. This is a sterile cup so once the top is off please try to avoid having your fingers or nasal tip touch the cup.      The specimen is ready to go to the lab for analysis when the fluid completely covers the bottom of the cup.  This may take several tries.  It is OK to add fluid to the cup just be sure to place the cup in the freezer and then pull the cup out to add more fluid at another time.     When there is enough fluid for analysis label the cup with your name, date of birth and that days date.  Place it in the blue specimen bag. It is very important that the specimen remain cold.  When you are driving the specimen to the lab, please place an ice pack in the bag with the cup. Don't forget to take the paper order for the Beta-2 transferrin.    Once you drop the specimen to the lab please call us at [phone] or send a [employer] message informing us that this has been completed. We would also like to know to which lab you have taken the specimen.

## 2024-08-16 ENCOUNTER — OFFICE VISIT (OUTPATIENT)
Dept: OTOLARYNGOLOGY | Facility: CLINIC | Age: 69
End: 2024-08-16
Payer: MEDICARE

## 2024-08-16 VITALS
SYSTOLIC BLOOD PRESSURE: 126 MMHG | WEIGHT: 198 LBS | BODY MASS INDEX: 29.24 KG/M2 | DIASTOLIC BLOOD PRESSURE: 77 MMHG | HEART RATE: 73 BPM

## 2024-08-16 DIAGNOSIS — G97.82 POSTOPERATIVE CSF LEAK: Primary | ICD-10-CM

## 2024-08-16 DIAGNOSIS — G96.00 POSTOPERATIVE CSF LEAK: Primary | ICD-10-CM

## 2024-08-16 DIAGNOSIS — Z98.890 STATUS POST FUNCTIONAL ENDOSCOPIC SINUS SURGERY (FESS): ICD-10-CM

## 2024-08-16 DIAGNOSIS — R49.0 HOARSE VOICE QUALITY: ICD-10-CM

## 2024-08-16 DIAGNOSIS — J32.9 CHRONIC SINUSITIS, UNSPECIFIED LOCATION: ICD-10-CM

## 2024-08-16 PROCEDURE — 99213 OFFICE O/P EST LOW 20 MIN: CPT | Mod: PBBFAC | Performed by: STUDENT IN AN ORGANIZED HEALTH CARE EDUCATION/TRAINING PROGRAM

## 2024-08-16 PROCEDURE — 99999 PR PBB SHADOW E&M-EST. PATIENT-LVL III: CPT | Mod: PBBFAC,,, | Performed by: STUDENT IN AN ORGANIZED HEALTH CARE EDUCATION/TRAINING PROGRAM

## 2024-08-16 RX ORDER — ROSUVASTATIN CALCIUM 20 MG/1
1 TABLET, COATED ORAL DAILY
COMMUNITY
Start: 2023-12-18 | End: 2024-12-17

## 2024-08-16 NOTE — PROGRESS NOTES
Subjective:      Sarabjit is a 68 y.o. male who comes for follow-up of  his CSF leak .  His last visit with me was on 5/17/2024.  States he was able to collect a small amount of fluid over the last 2 months.  He states that he does have episodic very very small volume drainage from the left side.  He was still using his sinus irrigations however.  Has reportedly over the last 2 weeks sore throat and hoarseness.    His current sinus regime consists of: Saline irrigations.     The assessment of quality and severity of symptoms as measured by the SNOT-22 score is deferred.    The patient's medications, allergies, past medical, surgical, social and family histories were reviewed and updated as appropriate.    ROS     A detailed review of systems was obtained with pertinent positives as per the above HPI, and otherwise negative.        Objective:     /77 (Patient Position: Sitting)   Pulse 73   Wt 89.8 kg (198 lb)   BMI 29.24 kg/m²        Constitutional:   Vital signs are normal. He appears well-developed and well-nourished.     Head:  Normocephalic and atraumatic.     Ears:  Hearing normal to normal and whispered voice; external ear normal without scars, lesions, or masses; ear canal, tympanic membrane, and middle ear normal..   Right Ear: No swelling. Tympanic membrane is not perforated and not bulging. No middle ear effusion.   Left Ear: No swelling. Tympanic membrane is not perforated and not bulging.  No middle ear effusion.     Nose:  Nose normal including turbinates, nasal mucosa, sinuses and nasal septum. No epistaxis.     Mouth/Throat  Oropharynx clear and moist without lesions or asymmetry and normal uvula midline. Normal dentition. No tonsillar abscesses. Tonsillar exudate.      Neck:  Neck normal without thyromegaly masses, asymmetry, normal tracheal structure, crepitus, and tenderness, thyroid normal, trachea normal, phonation normal, full range of motion with neck supple and no adenopathy. No  stridor present.        Head (right side): No submental adenopathy present.        Head (left side): No submental adenopathy present.     He has no cervical adenopathy.     Pulmonary/Chest:   No stridor.       Procedure    Nasal endoscopy performed.  See procedure note.    Nasal Endoscopy:  8/16/2024    The use of diagnostic nasal endoscopy was considered medically necessary for the evaluation and visualization of the nasal anatomy for symptoms suggestive of nasal or sinus origin. Physical examination (including a nasal speculum evaluation) did not provide sufficient clinical information to establish a diagnosis, or symptoms did not improve or worsened following treatment.     The nasal cavity was decongested with topical 1% phenylephrine and anesthetized with 4% lidocaine.  A rigid 0-degree endoscope was introduced into the nasal cavity.    The patient was seated in the examination chair. After discussion of risks and benefits, a nasal endoscope was inserted into the nose the endoscope was passed along the left nasal floor to the nasopharynx. It was then passed between the middle and superior meatus, nasal turbinates, nasal septum, nasopharynx and sphenoethmoid region. The nasal endoscope was withdrawn and there was no complications. An identical procedure was performed on the right side. I was present for the entire procedure.The patient tolerated the above procedure well. The findings of this procedure can be found in the dictated note from 8/16/2024 visit.                                            Widely patent frontal ethmoid maxillary and sphenoid sinuses bilaterally.  Decrease in polyposis and inflammation in his sinuses.  Left middle turbinate flap well apposed in the skull base.  No egress of CSF noted.    Data Reviewed    WBC (K/uL)   Date Value   12/21/2022 7.30     Eosinophil % (%)   Date Value   12/21/2022 4.1     Eos # (K/uL)   Date Value   12/21/2022 0.3     Platelets (K/uL)   Date Value   12/21/2022  "245     Glucose (mg/dL)   Date Value   12/21/2022 93   12/21/2022 93     No results found for: "IGE"    No sinus imaging available.      Assessment:     1. Postoperative CSF leak    2. Status post functional endoscopic sinus surgery (FESS)    3. Chronic sinusitis, unspecified location    4. Hoarse voice quality         Plan:     - we will send nasal fluid for beta 2 analysis.  - again very low concern in his likely his irrigations coming out from his nose.  - continue irrigations  - recommended aggressive hydration for his sore throat.  If no improvement we will refer to speech pathology for further evaluation.  - return to clinic in 6 months    Paulino Zhu MD     "

## 2024-12-04 ENCOUNTER — PATIENT MESSAGE (OUTPATIENT)
Dept: OTOLARYNGOLOGY | Facility: CLINIC | Age: 69
End: 2024-12-04
Payer: MEDICARE
